# Patient Record
Sex: MALE | Race: WHITE | NOT HISPANIC OR LATINO | Employment: FULL TIME | URBAN - METROPOLITAN AREA
[De-identification: names, ages, dates, MRNs, and addresses within clinical notes are randomized per-mention and may not be internally consistent; named-entity substitution may affect disease eponyms.]

---

## 2018-03-26 ENCOUNTER — HISTORICAL (OUTPATIENT)
Dept: ADMINISTRATIVE | Facility: HOSPITAL | Age: 43
End: 2018-03-26

## 2018-03-26 LAB
ABS NEUT (OLG): 3.37 X10(3)/MCL (ref 2.1–9.2)
ALBUMIN SERPL-MCNC: 4.4 GM/DL (ref 3.4–5)
ALBUMIN/GLOB SERPL: 1 RATIO (ref 1–2)
ALP SERPL-CCNC: 74 UNIT/L (ref 45–117)
ALT SERPL-CCNC: 174 UNIT/L (ref 12–78)
AST SERPL-CCNC: 111 UNIT/L (ref 15–37)
BASOPHILS # BLD AUTO: 0.05 X10(3)/MCL
BASOPHILS NFR BLD AUTO: 1 %
BILIRUB SERPL-MCNC: 0.6 MG/DL (ref 0.2–1)
BILIRUBIN DIRECT+TOT PNL SERPL-MCNC: 0.1 MG/DL
BILIRUBIN DIRECT+TOT PNL SERPL-MCNC: 0.5 MG/DL
BUN SERPL-MCNC: 17 MG/DL (ref 7–18)
CALCIUM SERPL-MCNC: 9 MG/DL (ref 8.5–10.1)
CHLORIDE SERPL-SCNC: 104 MMOL/L (ref 98–107)
CO2 SERPL-SCNC: 26 MMOL/L (ref 21–32)
CREAT SERPL-MCNC: 1.3 MG/DL (ref 0.6–1.3)
CRP SERPL-MCNC: <0.3 MG/DL
EOSINOPHIL # BLD AUTO: 0.11 10*3/UL
EOSINOPHIL NFR BLD AUTO: 2 %
ERYTHROCYTE [DISTWIDTH] IN BLOOD BY AUTOMATED COUNT: 15.3 % (ref 11.5–14.5)
ERYTHROCYTE [SEDIMENTATION RATE] IN BLOOD: 2 MM/HR (ref 0–15)
GLOBULIN SER-MCNC: 3.7 GM/ML (ref 2.3–3.5)
GLUCOSE SERPL-MCNC: 117 MG/DL (ref 74–106)
HAV IGM SERPL QL IA: NONREACTIVE
HBV CORE IGM SERPL QL IA: NONREACTIVE
HBV SURFACE AG SERPL QL IA: NEGATIVE
HCT VFR BLD AUTO: 41.5 % (ref 40–51)
HCV AB SERPL QL IA: NONREACTIVE
HGB BLD-MCNC: 13.1 GM/DL (ref 13.5–17.5)
HIV 1+2 AB+HIV1 P24 AG SERPL QL IA: NONREACTIVE
IMM GRANULOCYTES # BLD AUTO: 0.02 10*3/UL
IMM GRANULOCYTES NFR BLD AUTO: 0 %
LYMPHOCYTES # BLD AUTO: 1.32 X10(3)/MCL
LYMPHOCYTES NFR BLD AUTO: 24 % (ref 13–40)
MCH RBC QN AUTO: 21.3 PG (ref 26–34)
MCHC RBC AUTO-ENTMCNC: 31.6 GM/DL (ref 31–37)
MCV RBC AUTO: 67.5 FL (ref 80–100)
MONOCYTES # BLD AUTO: 0.53 X10(3)/MCL
MONOCYTES NFR BLD AUTO: 10 % (ref 4–12)
NEUTROPHILS # BLD AUTO: 3.37 X10(3)/MCL
NEUTROPHILS NFR BLD AUTO: 62 X10(3)/MCL
PLATELET # BLD AUTO: 339 X10(3)/MCL (ref 130–400)
PMV BLD AUTO: 9.7 FL (ref 7.4–10.4)
POTASSIUM SERPL-SCNC: 4 MMOL/L (ref 3.5–5.1)
PROT SERPL-MCNC: 8.1 GM/DL (ref 6.4–8.2)
RBC # BLD AUTO: 6.15 X10(6)/MCL (ref 4.5–5.9)
SODIUM SERPL-SCNC: 138 MMOL/L (ref 136–145)
WBC # SPEC AUTO: 5.4 X10(3)/MCL (ref 4.5–11)

## 2018-06-13 ENCOUNTER — HISTORICAL (OUTPATIENT)
Dept: RADIOLOGY | Facility: HOSPITAL | Age: 43
End: 2018-06-13

## 2018-09-05 ENCOUNTER — HISTORICAL (OUTPATIENT)
Dept: INTERNAL MEDICINE | Facility: CLINIC | Age: 43
End: 2018-09-05

## 2018-09-05 LAB
ABS NEUT (OLG): 2.31 X10(3)/MCL (ref 2.1–9.2)
ALBUMIN SERPL-MCNC: 4.1 GM/DL (ref 3.4–5)
ALBUMIN/GLOB SERPL: 1 RATIO (ref 1–2)
ALP SERPL-CCNC: 62 UNIT/L (ref 45–117)
ALT SERPL-CCNC: 41 UNIT/L (ref 12–78)
AST SERPL-CCNC: 38 UNIT/L (ref 15–37)
BASOPHILS # BLD AUTO: 0.05 X10(3)/MCL
BASOPHILS NFR BLD AUTO: 1 %
BILIRUB SERPL-MCNC: 0.5 MG/DL (ref 0.2–1)
BILIRUBIN DIRECT+TOT PNL SERPL-MCNC: 0.1 MG/DL
BILIRUBIN DIRECT+TOT PNL SERPL-MCNC: 0.4 MG/DL
BUN SERPL-MCNC: 20 MG/DL (ref 7–18)
CALCIUM SERPL-MCNC: 8.9 MG/DL (ref 8.5–10.1)
CHLORIDE SERPL-SCNC: 102 MMOL/L (ref 98–107)
CO2 SERPL-SCNC: 27 MMOL/L (ref 21–32)
CREAT SERPL-MCNC: 1.2 MG/DL (ref 0.6–1.3)
EOSINOPHIL # BLD AUTO: 0.11 X10(3)/MCL
EOSINOPHIL NFR BLD AUTO: 2 %
ERYTHROCYTE [DISTWIDTH] IN BLOOD BY AUTOMATED COUNT: 14.6 % (ref 11.5–14.5)
GLOBULIN SER-MCNC: 3.5 GM/ML (ref 2.3–3.5)
GLUCOSE SERPL-MCNC: 173 MG/DL (ref 74–106)
HCT VFR BLD AUTO: 40.1 % (ref 40–51)
HGB BLD-MCNC: 12.4 GM/DL (ref 13.5–17.5)
IMM GRANULOCYTES # BLD AUTO: 0.01 10*3/UL
IMM GRANULOCYTES NFR BLD AUTO: 0 %
LYMPHOCYTES # BLD AUTO: 1.61 X10(3)/MCL
LYMPHOCYTES NFR BLD AUTO: 35 % (ref 13–40)
MCH RBC QN AUTO: 20.9 PG (ref 26–34)
MCHC RBC AUTO-ENTMCNC: 30.9 GM/DL (ref 31–37)
MCV RBC AUTO: 67.7 FL (ref 80–100)
MONOCYTES # BLD AUTO: 0.52 X10(3)/MCL
MONOCYTES NFR BLD AUTO: 11 % (ref 4–12)
NEUTROPHILS # BLD AUTO: 2.31 X10(3)/MCL
NEUTROPHILS NFR BLD AUTO: 50 X10(3)/MCL
PLATELET # BLD AUTO: 338 X10(3)/MCL (ref 130–400)
PMV BLD AUTO: 10 FL (ref 7.4–10.4)
POTASSIUM SERPL-SCNC: 4 MMOL/L (ref 3.5–5.1)
PROT SERPL-MCNC: 7.6 GM/DL (ref 6.4–8.2)
RBC # BLD AUTO: 5.92 X10(6)/MCL (ref 4.5–5.9)
SODIUM SERPL-SCNC: 135 MMOL/L (ref 136–145)
WBC # SPEC AUTO: 4.6 X10(3)/MCL (ref 4.5–11)

## 2019-07-02 ENCOUNTER — HISTORICAL (OUTPATIENT)
Dept: RADIOLOGY | Facility: HOSPITAL | Age: 44
End: 2019-07-02

## 2019-07-20 ENCOUNTER — HISTORICAL (OUTPATIENT)
Dept: LAB | Facility: HOSPITAL | Age: 44
End: 2019-07-20

## 2019-07-20 LAB
ALBUMIN SERPL-MCNC: 4.1 GM/DL (ref 3.4–5)
ALBUMIN/GLOB SERPL: 1.2 RATIO (ref 1.1–2)
ALP SERPL-CCNC: 66 UNIT/L (ref 46–116)
ALT SERPL-CCNC: 89 UNIT/L (ref 12–78)
AST SERPL-CCNC: 61 UNIT/L (ref 15–37)
BILIRUB SERPL-MCNC: 0.5 MG/DL (ref 0.2–1)
BILIRUBIN DIRECT+TOT PNL SERPL-MCNC: 0.14 MG/DL (ref 0–0.2)
BILIRUBIN DIRECT+TOT PNL SERPL-MCNC: 0.36 MG/DL (ref 0–0.8)
BUN SERPL-MCNC: 27.1 MG/DL (ref 7–18)
CALCIUM SERPL-MCNC: 8.9 MG/DL (ref 8.5–10.1)
CHLORIDE SERPL-SCNC: 103 MMOL/L (ref 98–107)
CHOLEST SERPL-MCNC: 256 MG/DL (ref 0–200)
CHOLEST/HDLC SERPL: 5.8 {RATIO} (ref 0–5)
CO2 SERPL-SCNC: 28.3 MMOL/L (ref 21–32)
CREAT SERPL-MCNC: 1.33 MG/DL (ref 0.6–1.3)
FT4I SERPL CALC-MCNC: 2.14
GLOBULIN SER-MCNC: 3.4 GM/DL (ref 2.4–3.5)
GLUCOSE SERPL-MCNC: 112 MG/DL (ref 74–106)
HDLC SERPL-MCNC: 44 MG/DL (ref 40–60)
LDLC SERPL CALC-MCNC: 148 MG/DL (ref 0–129)
POTASSIUM SERPL-SCNC: 3.9 MMOL/L (ref 3.5–5.1)
PROT SERPL-MCNC: 7.5 GM/DL (ref 6.4–8.2)
SODIUM SERPL-SCNC: 140 MMOL/L (ref 136–145)
T3RU NFR SERPL: 35 % (ref 31–39)
T4 SERPL-MCNC: 6.1 MCG/DL (ref 4.7–13.3)
TRIGL SERPL-MCNC: 322 MG/DL
TSH SERPL-ACNC: 3.34 MIU/ML (ref 0.36–3.74)
VLDLC SERPL CALC-MCNC: 64 MG/DL

## 2019-07-26 ENCOUNTER — HISTORICAL (OUTPATIENT)
Dept: RADIOLOGY | Facility: HOSPITAL | Age: 44
End: 2019-07-26

## 2019-07-26 LAB
HAV IGM SERPL QL IA: NEGATIVE
HBV CORE IGM SERPL QL IA: NEGATIVE
HBV SURFACE AG SERPL QL IA: NEGATIVE
HCV AB SERPL QL IA: NEGATIVE
HEPATITIS PANEL INTERP: NORMAL

## 2022-05-03 NOTE — HISTORICAL [facility]ER
This is a historical note converted from Chastity. Formatting and pictures may have been removed.  Please reference Chastity for original formatting and attached multimedia. Chief Complaint  dactylis took his blood pressure pills recently  History of Present Illness  This 43-year-old  male with past medical history of dactylitis originally diagnosed with Dr. Syed 5+ years ago in her privative practice who presents to [facility] rheumatology clinic for?establishment here.??He was diagnosed with tonsillitis because he did not?seem to?clinically or serologically the criteria for other diagnoses.??He states he was at one point on Humira?injections?however stopped 3 years ago.?Hhe does continue to get frequent flareups affecting his MCPs wrists and bilateral knees. ?Last?flareup of?the S September 2017 affecting his right with signs of edema,?synovitis?and swelling. ?She currently has swelling and left MCPs and states that?his frequent discomfort as he is a  and uses his hands?for work. ?At this initial visit with?like to?have lab work to test for?seronegative spondyloarthropathies as well as x-ray of chest?pelvis and hands. ?Patient is given Medrol Dosepak to have on hold if flareup occurs. ?Patient follow-up in 4 months. ?At this time will not start patient on any medication will just observe and?watch for disease progression.  ?  Review of Systems  Constitutional: no weightloss,?fever, chills, weakness or fatigue  Eye: No visual loss, blurred vision, double vision or yellow sclerae.  ENMT: No hearing loss, sneezing, congestion, runny nose or sore throat.  Respiratory: No shortness of breath, cough or sputum. No Dyspnea on exertion.  Cardiovascular: No chest pain, chest pressure or chest discomfort. No palpitations or edema.  Gastrointestinal: no anorexia, nausea, vomiting or diarrhea. No abdominal pain or blood.  Genitourinary: No burning on urination.  Musculoskeletal:+hand, knee, ankle, and wrist  pain  Integumentary: No rash or itching.  Neurologic: No headache, dizziness, syncope, paralysis, ataxia, numbness or tingling in the extremities. No change in bowel or bladder control.  All Other ROS_  Physical Exam  Vitals & Measurements  T:?36.6? ?C (Oral)? HR:?73(Peripheral)? RR:?18? BP:?149/97?  HT:?185?cm? HT:?185?cm? WT:?88.6?kg? WT:?88.6?kg? BMI:?25.89?  General: AAOx3, NAD, alert and cooperative  HEENT: PERRLA, EOMI, normal conjunctiva  Neck: no LAD, no JVD, supple, no masses or thyromegaly  CVS: S1/S2 nml, RRR, no murmurs, rubs or gallops, no carotid bruits  Resp: CTA b/l, no wheezing, no rhonchi, no rales  GI: Not distended, not tender, BS+, no guarding  Skin: not jaundiced, warm, no rashes  Musculoskeletal: normal ROM, no joint tenderness, normal muscular development  Extremities: + Slight erythema and signs of?synovitis of?left 1st MCP.  Neuro: CN II-XII grossly intact, strength and sensation symmetric and intact throughout, no focal neurological deficits  Assessment/Plan  1. ?Dactylitis  ??? - Patient with no obvious physical signs of other diagnosis at this point  ????- lab work CBC, CMP, ESR, SED, HLAb-27, HIV, hepatitis, and TB?quant gold  ??? -?Xray- hands, pelvis, chest  ??? - medrol-dose pack on hold if he has a flare before follow up visit.  ?  ?  ?  Follow-up in 4 months   Problem List/Past Medical History  Ongoing  No chronic problems  Historical  No qualifying data  Medications  ALLERGY TAB 10MG, 10 mg= 1 tab(s), Oral, Daily  AMLODIPINE TAB 10MG, 10 mg= 1 tab(s), Oral, Daily  atenolol 25 mg oral tablet, 25 mg= 1 tab(s), Oral, Daily  BUSPIRONE TAB 15MG  desvenlafaxine 50 mg oral tablet, extended release, 50 mg= 1 tab(s), Oral, Daily  DICLOFENAC TAB 75MG DR, 75 mg= 1 tab(s), Oral, BID,? ?Not taking: Last Dose Date/Time unknown  GABAPENTIN CAP 100MG, 300 mg= 3 cap(s), Oral, TID,? ?Not taking: Last Dose Date/Time Unknown  GABAPENTIN TAB 800MG, 800 mg= 1 tab(s), Oral, TID  HYDROCHLOROT CAP 12.5MG,  12.5 mg= 1 cap(s), Oral, Daily  HYDROXYZ HCL TAB 50MG  INDOMETHACIN CAP 25MG, 25 mg= 1 cap(s), Oral, Daily,? ?Not taking: Last Dose Date/Time Unknown  INDOMETHACIN CAP 50MG  LOSARTAN POT TAB 50MG, 50 mg= 1 tab(s), Oral, Daily  METHOCARBAM TAB 750MG, 750 mg= 1 tab(s), Oral, TID  PREDNISONE TAB 20MG,? ?Not taking: Last Dose Date/Time unknown  TRAZODONE TAB 150MG, 150 mg= 1 tab(s), Oral, qPM  VENLAFAXINE TAB 75MG  Allergies  No Known Allergies  Social History  Alcohol - Low Risk, 09/23/2014  Current, 3-5 times per week, 09/23/2014  Employment/School  Employed, Activity level: Moderate physical work. Highest education level: High school. Operates hazardous equipment: No., 03/26/2018  Exercise  Self assessment: Fair condition., 03/26/2018  Home/Environment  Lives with Children, Spouse. Living situation: Home/Independent. Alcohol abuse in household: No. Substance abuse in household: No. Smoker in household: No. Injuries/Abuse/Neglect in household: No. Feels unsafe at home: No. Family/Friends available for support: Yes. Concern for family members at home: No. Major illness in household: No. Financial concerns: No. TV/Computer concerns: No., 03/26/2018  Nutrition/Health  Regular, 03/26/2018  Substance Abuse - Denies Substance Abuse, 09/23/2014  Never, 03/26/2018  Tobacco - Denies Tobacco Use, 09/23/2014  Never smoker, 09/26/2017  Family History  Cancer: Father.  Heart failure.: Father.  Hypertension.: Mother and Father.  Rheumatoid arthritis: Father.      HLA-B27 + result

## 2022-06-01 ENCOUNTER — HOSPITAL ENCOUNTER (EMERGENCY)
Facility: HOSPITAL | Age: 47
Discharge: HOME | End: 2022-06-01
Attending: STUDENT IN AN ORGANIZED HEALTH CARE EDUCATION/TRAINING PROGRAM

## 2022-06-01 VITALS
BODY MASS INDEX: 24.38 KG/M2 | HEART RATE: 57 BPM | OXYGEN SATURATION: 99 % | RESPIRATION RATE: 18 BRPM | DIASTOLIC BLOOD PRESSURE: 82 MMHG | WEIGHT: 190 LBS | HEIGHT: 74 IN | TEMPERATURE: 98 F | SYSTOLIC BLOOD PRESSURE: 150 MMHG

## 2022-06-01 DIAGNOSIS — M51.27 HERNIATION OF INTERVERTEBRAL DISC BETWEEN L5 AND S1: Primary | ICD-10-CM

## 2022-06-01 DIAGNOSIS — G95.20 SPINAL CORD COMPRESSION: ICD-10-CM

## 2022-06-01 PROCEDURE — 63600175 PHARM REV CODE 636 W HCPCS: Performed by: STUDENT IN AN ORGANIZED HEALTH CARE EDUCATION/TRAINING PROGRAM

## 2022-06-01 PROCEDURE — 99285 EMERGENCY DEPT VISIT HI MDM: CPT | Mod: 25

## 2022-06-01 PROCEDURE — 96374 THER/PROPH/DIAG INJ IV PUSH: CPT

## 2022-06-01 PROCEDURE — 96375 TX/PRO/DX INJ NEW DRUG ADDON: CPT

## 2022-06-01 RX ORDER — MORPHINE SULFATE 4 MG/ML
4 INJECTION, SOLUTION INTRAMUSCULAR; INTRAVENOUS
Status: COMPLETED | OUTPATIENT
Start: 2022-06-01 | End: 2022-06-01

## 2022-06-01 RX ORDER — DIPHENHYDRAMINE HYDROCHLORIDE 50 MG/ML
50 INJECTION INTRAMUSCULAR; INTRAVENOUS
Status: COMPLETED | OUTPATIENT
Start: 2022-06-01 | End: 2022-06-01

## 2022-06-01 RX ORDER — TRAMADOL HYDROCHLORIDE 50 MG/1
50 TABLET ORAL EVERY 6 HOURS PRN
Qty: 15 TABLET | Refills: 0 | Status: SHIPPED | OUTPATIENT
Start: 2022-06-01 | End: 2022-11-16 | Stop reason: SDUPTHER

## 2022-06-01 RX ORDER — KETOROLAC TROMETHAMINE 10 MG/1
10 TABLET, FILM COATED ORAL EVERY 6 HOURS
Qty: 20 TABLET | Refills: 0 | Status: SHIPPED | OUTPATIENT
Start: 2022-06-01 | End: 2022-06-06

## 2022-06-01 RX ORDER — PREDNISONE 50 MG/1
50 TABLET ORAL DAILY
Qty: 7 TABLET | Refills: 0 | Status: SHIPPED | OUTPATIENT
Start: 2022-06-01 | End: 2022-11-16 | Stop reason: SDUPTHER

## 2022-06-01 RX ORDER — GABAPENTIN 300 MG/1
300 CAPSULE ORAL 3 TIMES DAILY
Qty: 90 CAPSULE | Refills: 0 | Status: SHIPPED | OUTPATIENT
Start: 2022-06-01 | End: 2022-06-01 | Stop reason: CLARIF

## 2022-06-01 RX ADMIN — DIPHENHYDRAMINE HYDROCHLORIDE 50 MG: 50 INJECTION, SOLUTION INTRAMUSCULAR; INTRAVENOUS at 05:06

## 2022-06-01 RX ADMIN — MORPHINE SULFATE 4 MG: 4 INJECTION INTRAVENOUS at 05:06

## 2022-06-01 NOTE — Clinical Note
"Helder Phillipssarah Fish was seen and treated in our emergency department on 6/1/2022.  He may return to work on 06/06/2022.  Upon return joanie work please allow light duty until further evaluation by MD     If you have any questions or concerns, please don't hesitate to call.      Earline Lainez MD"

## 2022-06-01 NOTE — ED NOTES
Pt fell off a ladder - landed on his abdoman- free of any contusions or pain to abd.  Reports lower back pain- gait steady- extremities free of any pain or edema-denies numbness or tingling to arms or legs. Denies visual problems- rt eye ecchymosis noted- perrla. Denies loc. Denies neck pain- free of nv.  C/o rt thumb/hand pain - mild swelling noted- cap refill <2secs/+rom of thumb.
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
## 2022-06-01 NOTE — ED NOTES
DR ROCHE SPOKE W NEUROSURGERY ON THE PHONE- PT TCAN BE TREATED CONSERVATIVELY DUE TO  HAVING NO DEFICITS- WLL NOT BE TRANSFERRED.  MORGAN MELCHOR IN TO UPDATE PT .

## 2022-06-01 NOTE — DISCHARGE INSTRUCTIONS
Take all medications as prescribed- take home Rx gabapentin    RETURN TO ED FOR ANY WEAKNESS/NUMBNESS/LOSS OF BOWEL OR BLADDER FUNCTION

## 2022-06-01 NOTE — ED NOTES
PT ITCHING RESOLVED- REDNESS RESOLVED- FREE OF ANY HIVES. AWAITS [facility] TO CALL BACK ABOUT ACCEPTANCE.

## 2022-06-01 NOTE — Clinical Note
"Helder Phillipssarah Fish was seen and treated in our emergency department on 6/1/2022.  He may return to work on 06/06/2022.  Upon return to work please allow light duty until further evaluation by MD     If you have any questions or concerns, please don't hesitate to call.      Earline Lainez MD"
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
## 2022-11-16 ENCOUNTER — HOSPITAL ENCOUNTER (EMERGENCY)
Facility: HOSPITAL | Age: 47
Discharge: HOME | End: 2022-11-16
Attending: FAMILY MEDICINE
Payer: MEDICAID

## 2022-11-16 VITALS
SYSTOLIC BLOOD PRESSURE: 165 MMHG | TEMPERATURE: 98 F | DIASTOLIC BLOOD PRESSURE: 98 MMHG | HEIGHT: 75 IN | BODY MASS INDEX: 23.62 KG/M2 | RESPIRATION RATE: 18 BRPM | HEART RATE: 61 BPM | WEIGHT: 190 LBS | OXYGEN SATURATION: 98 %

## 2022-11-16 DIAGNOSIS — R52 PAIN: ICD-10-CM

## 2022-11-16 DIAGNOSIS — M25.569 KNEE PAIN, UNSPECIFIED CHRONICITY, UNSPECIFIED LATERALITY: Primary | ICD-10-CM

## 2022-11-16 PROCEDURE — 63600175 PHARM REV CODE 636 W HCPCS: Performed by: FAMILY MEDICINE

## 2022-11-16 PROCEDURE — 99284 EMERGENCY DEPT VISIT MOD MDM: CPT | Mod: 25

## 2022-11-16 PROCEDURE — 96372 THER/PROPH/DIAG INJ SC/IM: CPT | Performed by: FAMILY MEDICINE

## 2022-11-16 RX ORDER — TRAMADOL HYDROCHLORIDE 50 MG/1
50 TABLET ORAL EVERY 6 HOURS PRN
Qty: 12 TABLET | Refills: 0 | Status: SHIPPED | OUTPATIENT
Start: 2022-11-16 | End: 2022-11-19

## 2022-11-16 RX ORDER — KETOROLAC TROMETHAMINE 30 MG/ML
60 INJECTION, SOLUTION INTRAMUSCULAR; INTRAVENOUS
Status: COMPLETED | OUTPATIENT
Start: 2022-11-16 | End: 2022-11-16

## 2022-11-16 RX ORDER — PREDNISONE 50 MG/1
50 TABLET ORAL DAILY
Qty: 5 TABLET | Refills: 0 | Status: SHIPPED | OUTPATIENT
Start: 2022-11-16 | End: 2022-11-21

## 2022-11-16 RX ADMIN — KETOROLAC TROMETHAMINE 60 MG: 30 INJECTION, SOLUTION INTRAMUSCULAR at 05:11

## 2022-11-16 NOTE — ED PROVIDER NOTES
Encounter Date: 11/16/2022       History     Chief Complaint   Patient presents with    Knee Pain     Left Knee pain, may have twisted it x 1 week.      47-year-old with left knee pain patient may have twisted the knee 1 week ago otherwise no abrasions or lacerations no other signs of injury no head injury GCS of 15 ambulation makes it worse immobilization makes it better patient can not bear weight knee does pop occasionally      Review of patient's allergies indicates:  No Known Allergies  No past medical history on file.  No past surgical history on file.  No family history on file.     Review of Systems   Constitutional:  Negative for fever.   HENT:  Negative for sore throat.    Respiratory:  Negative for shortness of breath.    Cardiovascular:  Negative for chest pain.   Gastrointestinal:  Negative for nausea.   Genitourinary:  Negative for dysuria.   Musculoskeletal:  Positive for arthralgias and myalgias. Negative for back pain.   Skin:  Negative for rash.   Neurological:  Negative for weakness.   Hematological:  Does not bruise/bleed easily.   All other systems reviewed and are negative.    Physical Exam     Initial Vitals [11/16/22 1703]   BP Pulse Resp Temp SpO2   (!) 165/98 61 18 98.3 °F (36.8 °C) 98 %      MAP       --         Physical Exam    Nursing note and vitals reviewed.  Constitutional: He appears well-developed and well-nourished. He is not diaphoretic. No distress.   HENT:   Head: Normocephalic and atraumatic.   Right Ear: External ear normal.   Left Ear: External ear normal.   Nose: Nose normal.   Mouth/Throat: Oropharynx is clear and moist. No oropharyngeal exudate.   Eyes: Conjunctivae and EOM are normal. Pupils are equal, round, and reactive to light. Right eye exhibits no discharge. Left eye exhibits no discharge.   Neck: Neck supple. No thyromegaly present. No tracheal deviation present. No JVD present.   Normal range of motion.  Cardiovascular:  Normal rate, regular rhythm, normal heart  sounds and intact distal pulses.     Exam reveals no gallop and no friction rub.       No murmur heard.  Pulmonary/Chest: Breath sounds normal. No stridor. No respiratory distress. He has no wheezes. He has no rhonchi. He has no rales. He exhibits no tenderness.   Abdominal: Abdomen is soft. Bowel sounds are normal. He exhibits no distension. There is no abdominal tenderness. There is no rebound and no guarding.   Musculoskeletal:         General: Tenderness present. No edema. Normal range of motion.      Cervical back: Normal range of motion and neck supple.     Lymphadenopathy:     He has no cervical adenopathy.   Neurological: He is alert and oriented to person, place, and time. He has normal strength and normal reflexes. No cranial nerve deficit or sensory deficit. GCS score is 15. GCS eye subscore is 4. GCS verbal subscore is 5. GCS motor subscore is 6.   Skin: Skin is warm and dry. No rash and no abscess noted. No erythema.   Psychiatric: He has a normal mood and affect. His behavior is normal. Judgment and thought content normal.       ED Course   Procedures  Labs Reviewed - No data to display       Imaging Results    None          Medications - No data to display                           Clinical Impression:   Final diagnoses:  [R52] Pain               Johnathon Urbina MD  11/18/22 1955

## 2023-01-16 ENCOUNTER — HOSPITAL ENCOUNTER (EMERGENCY)
Facility: HOSPITAL | Age: 48
Discharge: HOME | End: 2023-01-16
Attending: STUDENT IN AN ORGANIZED HEALTH CARE EDUCATION/TRAINING PROGRAM
Payer: MEDICAID

## 2023-01-16 VITALS
TEMPERATURE: 98 F | HEIGHT: 73 IN | BODY MASS INDEX: 25.18 KG/M2 | HEART RATE: 65 BPM | WEIGHT: 190 LBS | RESPIRATION RATE: 20 BRPM | SYSTOLIC BLOOD PRESSURE: 156 MMHG | DIASTOLIC BLOOD PRESSURE: 92 MMHG | OXYGEN SATURATION: 99 %

## 2023-01-16 DIAGNOSIS — M54.31 SCIATICA OF RIGHT SIDE: Primary | ICD-10-CM

## 2023-01-16 PROCEDURE — 96372 THER/PROPH/DIAG INJ SC/IM: CPT | Performed by: STUDENT IN AN ORGANIZED HEALTH CARE EDUCATION/TRAINING PROGRAM

## 2023-01-16 PROCEDURE — 99284 EMERGENCY DEPT VISIT MOD MDM: CPT

## 2023-01-16 PROCEDURE — 63600175 PHARM REV CODE 636 W HCPCS: Performed by: STUDENT IN AN ORGANIZED HEALTH CARE EDUCATION/TRAINING PROGRAM

## 2023-01-16 PROCEDURE — 25000003 PHARM REV CODE 250: Performed by: STUDENT IN AN ORGANIZED HEALTH CARE EDUCATION/TRAINING PROGRAM

## 2023-01-16 RX ORDER — METHOCARBAMOL 750 MG/1
1500 TABLET, FILM COATED ORAL 2 TIMES DAILY PRN
Qty: 20 TABLET | Refills: 0 | Status: SHIPPED | OUTPATIENT
Start: 2023-01-16 | End: 2023-01-21

## 2023-01-16 RX ORDER — HYDROCODONE BITARTRATE AND ACETAMINOPHEN 10; 325 MG/1; MG/1
1 TABLET ORAL
Status: COMPLETED | OUTPATIENT
Start: 2023-01-16 | End: 2023-01-16

## 2023-01-16 RX ORDER — HYDROCODONE BITARTRATE AND ACETAMINOPHEN 5; 325 MG/1; MG/1
1 TABLET ORAL EVERY 6 HOURS PRN
Qty: 12 TABLET | Refills: 0 | OUTPATIENT
Start: 2023-01-16 | End: 2023-01-20

## 2023-01-16 RX ORDER — KETOROLAC TROMETHAMINE 30 MG/ML
30 INJECTION, SOLUTION INTRAMUSCULAR; INTRAVENOUS
Status: COMPLETED | OUTPATIENT
Start: 2023-01-16 | End: 2023-01-16

## 2023-01-16 RX ADMIN — HYDROCODONE BITARTRATE AND ACETAMINOPHEN 1 TABLET: 10; 325 TABLET ORAL at 11:01

## 2023-01-16 RX ADMIN — KETOROLAC TROMETHAMINE 30 MG: 30 INJECTION, SOLUTION INTRAMUSCULAR; INTRAVENOUS at 11:01

## 2023-01-16 NOTE — ED PROVIDER NOTES
"Encounter Date: 1/16/2023       History     Chief Complaint   Patient presents with    Back Pain     Complains of right lower back pain/sciatic pain since yesterday after turning     Patient is a 47-year-old white male no significant past medical history presented to the ER today due to right-sided back pain.  Patient states he turned wrong when lifting something in his work.  Patient states he is felt sharp pain radiating down the posterior aspect of his right leg ever since.  Injury occurred approximately 2 days ago.  Patient denies any urinary incontinence, urinary retention, fecal incontinence, saddle anesthesia.  Patient has taken some gabapentin for it without much relief.  Patient states he is had issues and this is probably the "4th flare up. "Patient states he usually takes pain medicine and he gets an injection which resolves it.  Patient has a ride home today.  Patient denies any interval trauma, chest pain, shortness of breath, abdominal pain.    Review of patient's allergies indicates:  No Known Allergies  Past Medical History:   Diagnosis Date    Arthritis     Back pain     chronic    Hypertension      History reviewed. No pertinent surgical history.  History reviewed. No pertinent family history.  Social History     Tobacco Use    Smoking status: Never    Smokeless tobacco: Never   Substance Use Topics    Alcohol use: Yes     Comment: occas     Review of Systems   Constitutional:  Negative for chills, fatigue and fever.   HENT:  Negative for congestion, sore throat and trouble swallowing.    Eyes:  Negative for pain and visual disturbance.   Respiratory:  Negative for cough, shortness of breath and wheezing.    Cardiovascular:  Negative for chest pain and palpitations.   Gastrointestinal:  Negative for abdominal pain, blood in stool, constipation, diarrhea, nausea and vomiting.   Genitourinary:  Negative for dysuria and hematuria.   Musculoskeletal:  Positive for back pain. Negative for myalgias. "   Skin:  Negative for rash and wound.   Neurological:  Negative for seizures, syncope and headaches.   Psychiatric/Behavioral:  Negative for confusion. The patient is not nervous/anxious.      Physical Exam     Initial Vitals [01/16/23 0949]   BP Pulse Resp Temp SpO2   (!) 156/92 65 18 97.8 °F (36.6 °C) 99 %      MAP       --         Physical Exam    Nursing note and vitals reviewed.  Constitutional: He appears well-developed and well-nourished. No distress.   HENT:   Head: Normocephalic and atraumatic.   Eyes: Conjunctivae and EOM are normal. Right eye exhibits no discharge. Left eye exhibits no discharge. No scleral icterus.   Neck: No tracheal deviation present.   Normal range of motion.  Cardiovascular:  Normal rate, regular rhythm and normal heart sounds.     Exam reveals no gallop and no friction rub.       No murmur heard.  Pulmonary/Chest: Breath sounds normal. No respiratory distress. He has no wheezes. He has no rhonchi. He has no rales.   Musculoskeletal:         General: Tenderness present. No edema. Normal range of motion.      Cervical back: Normal range of motion.      Comments: No C, T, L-spine tenderness no step-off lesions.  There is some tenderness palpation of the right gluteus region.  No rashes.  Positive straight leg raise on the right.     Neurological: He is alert.   Skin: Skin is warm and dry. No rash and no abscess noted. No erythema. No pallor.   Psychiatric: His behavior is normal. Judgment normal.       ED Course   Procedures  Labs Reviewed - No data to display       Imaging Results    None          Medications   HYDROcodone-acetaminophen  mg per tablet 1 tablet (has no administration in time range)   ketorolac injection 30 mg (has no administration in time range)     Medical Decision Making:   Initial Assessment:   Overall well-appearing 47-year-old male  Differential Diagnosis:   Sciatica, lumbar strain, vertebral fracture  ED Management:  Vital signs stable patient is  afebrile  Denies red flag symptoms back pain   There is no L-spine, T-spine or C-spine tenderness and no step-off lesions   Tenderness in the gluteal region with a positive straight leg raise most consistent with sciatica   No indication for imaging at this time as it does not seem to be a traumatic cause   Seems to be lifting injury   Cuney 10/325 given as patient does have a ride home   Rice therapy advised   Robaxin sent to pharmacy   Return precautions discussed and follow up with PCP is recommended                        Clinical Impression:   Final diagnoses:  [M54.31] Sciatica of right side (Primary)        ED Disposition Condition    Discharge Stable          ED Prescriptions       Medication Sig Dispense Start Date End Date Auth. Provider    HYDROcodone-acetaminophen (NORCO) 5-325 mg per tablet Take 1 tablet by mouth every 6 (six) hours as needed for Pain. 12 tablet 1/16/2023 -- Farhan Humphrey MD    methocarbamoL (ROBAXIN) 750 MG Tab Take 2 tablets (1,500 mg total) by mouth 2 (two) times daily as needed (pain). 20 tablet 1/16/2023 1/21/2023 Farhan Humphrey MD          Follow-up Information       Follow up With Specialties Details Why Contact Info    [facility] - Emergency Dept Emergency Medicine  If symptoms worsen [address] [address] [phone]             Farhan Humphrey MD  01/16/23 1321

## 2023-01-16 NOTE — Clinical Note
"Helder Fish (Brian) was seen and treated in our emergency department on 1/16/2023.  He may return to work on 01/17/2023.       If you have any questions or concerns, please don't hesitate to call.       RN    "

## 2023-01-20 ENCOUNTER — HOSPITAL ENCOUNTER (EMERGENCY)
Facility: HOSPITAL | Age: 48
Discharge: HOME | End: 2023-01-20
Attending: EMERGENCY MEDICINE
Payer: MEDICAID

## 2023-01-20 VITALS
RESPIRATION RATE: 18 BRPM | WEIGHT: 190 LBS | HEART RATE: 66 BPM | HEIGHT: 74 IN | BODY MASS INDEX: 24.38 KG/M2 | DIASTOLIC BLOOD PRESSURE: 82 MMHG | SYSTOLIC BLOOD PRESSURE: 132 MMHG | OXYGEN SATURATION: 97 % | TEMPERATURE: 99 F

## 2023-01-20 DIAGNOSIS — M54.31 SCIATICA, RIGHT SIDE: Primary | ICD-10-CM

## 2023-01-20 DIAGNOSIS — M51.26 LUMBAR DISC HERNIATION: ICD-10-CM

## 2023-01-20 PROCEDURE — 25000003 PHARM REV CODE 250: Performed by: EMERGENCY MEDICINE

## 2023-01-20 PROCEDURE — 99285 EMERGENCY DEPT VISIT HI MDM: CPT | Mod: 25

## 2023-01-20 PROCEDURE — 96372 THER/PROPH/DIAG INJ SC/IM: CPT | Performed by: EMERGENCY MEDICINE

## 2023-01-20 PROCEDURE — 63600175 PHARM REV CODE 636 W HCPCS: Performed by: EMERGENCY MEDICINE

## 2023-01-20 RX ORDER — OXYCODONE AND ACETAMINOPHEN 10; 325 MG/1; MG/1
1 TABLET ORAL EVERY 6 HOURS PRN
Qty: 27 TABLET | Refills: 0 | Status: SHIPPED | OUTPATIENT
Start: 2023-01-20 | End: 2023-01-26 | Stop reason: ALTCHOICE

## 2023-01-20 RX ORDER — DEXAMETHASONE SODIUM PHOSPHATE 4 MG/ML
8 INJECTION, SOLUTION INTRA-ARTICULAR; INTRALESIONAL; INTRAMUSCULAR; INTRAVENOUS; SOFT TISSUE
Status: COMPLETED | OUTPATIENT
Start: 2023-01-20 | End: 2023-01-20

## 2023-01-20 RX ORDER — PREDNISONE 20 MG/1
20 TABLET ORAL DAILY
Qty: 19 TABLET | Refills: 0 | Status: SHIPPED | OUTPATIENT
Start: 2023-01-20 | End: 2023-01-27

## 2023-01-20 RX ORDER — HYDROMORPHONE HYDROCHLORIDE 2 MG/ML
1 INJECTION, SOLUTION INTRAMUSCULAR; INTRAVENOUS; SUBCUTANEOUS
Status: COMPLETED | OUTPATIENT
Start: 2023-01-20 | End: 2023-01-20

## 2023-01-20 RX ORDER — ONDANSETRON 4 MG/1
4 TABLET, ORALLY DISINTEGRATING ORAL
Status: COMPLETED | OUTPATIENT
Start: 2023-01-20 | End: 2023-01-20

## 2023-01-20 RX ADMIN — ONDANSETRON 4 MG: 4 TABLET, ORALLY DISINTEGRATING ORAL at 01:01

## 2023-01-20 RX ADMIN — DEXAMETHASONE SODIUM PHOSPHATE 8 MG: 4 INJECTION, SOLUTION INTRA-ARTICULAR; INTRALESIONAL; INTRAMUSCULAR; INTRAVENOUS; SOFT TISSUE at 01:01

## 2023-01-20 RX ADMIN — HYDROMORPHONE HYDROCHLORIDE 1 MG: 2 INJECTION INTRAMUSCULAR; INTRAVENOUS; SUBCUTANEOUS at 01:01

## 2023-01-20 NOTE — Clinical Note
"Helder Phillipssarah Fish was seen and treated in our emergency department on 1/20/2023.  He may return to work on 01/31/2023.  Or when cleared by MD     If you have any questions or concerns, please don't hesitate to call.      William Dougherty Jr., MD"

## 2023-01-20 NOTE — ED PROVIDER NOTES
Encounter Date: 1/20/2023       History     Chief Complaint   Patient presents with    Back Pain     C/o acute on chronic sciatica.Seen recently at [facility] ED for same 2 days ago     47-year-old male is here with his wife stating he is having an exacerbation of his chronic right low back pain with associated sciatica.  He states he is aware that he has a disc herniation and was told that he needs to see a spine surgeon and that it could become paralyzed if he did not get this taken care of.  He has never contacted a spine surgeon and comes to the ER today seeking pain relief.  He denies recent injury.  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  He has some paresthesias in his right foot.  His pain radiates from his right low back posteriorly into his right buttock and right lower extremity down to his calf.  He states he went to a chiropractor yesterday and had a adjustment but it did not help.  He has no saddle anesthesia nor weakness.  He has no incontinence no retention of urine or feces.    Review of patient's allergies indicates:  No Known Allergies  Past Medical History:   Diagnosis Date    Arthritis     Back pain     chronic    Hypertension      No past surgical history on file.  No family history on file.  Social History     Tobacco Use    Smoking status: Never    Smokeless tobacco: Never   Substance Use Topics    Alcohol use: Yes     Comment: occas     Review of Systems   Constitutional:  Negative for fever.   HENT:  Negative for sore throat.    Respiratory:  Negative for shortness of breath.    Cardiovascular:  Negative for chest pain.   Gastrointestinal:  Negative for nausea.   Genitourinary:  Negative for dysuria.   Musculoskeletal:  Positive for back pain.   Skin:  Negative for rash.   Neurological:  Negative for weakness.   Hematological:  Does not bruise/bleed easily.     Physical Exam     Initial Vitals [01/20/23 1113]   BP  Pulse Resp Temp SpO2   132/82 66 18 98.6 °F (37 °C) 97 %      MAP       --         Physical Exam    Nursing note and vitals reviewed.  Constitutional: He appears well-developed.   HENT:   Head: Normocephalic.   Eyes: Conjunctivae are normal.   Cardiovascular:  Normal rate, regular rhythm and normal heart sounds.           Pulmonary/Chest: Breath sounds normal.   Abdominal: Abdomen is soft. Bowel sounds are normal. He exhibits no distension. There is no abdominal tenderness.   Musculoskeletal:         General: No edema.     Lymphadenopathy:     He has no cervical adenopathy.   Neurological: He is alert and oriented to person, place, and time. He has normal strength. A sensory deficit is present.   Patient has decreased sensation to light touch in the right lower extremity versus the left lower extremity although it is not   Skin: Skin is warm.   Psychiatric: He has a normal mood and affect.       ED Course   Procedures  Labs Reviewed - No data to display       Imaging Results              MRI Lumbar Spine Without Contrast (Final result)  Result time 01/20/23 14:50:01      Final result by Nicole Valerio MD (01/20/23 14:50:01)                   Impression:      1. Right central disc protrusion at L5-S1 impinges on the descending right S1 nerve roots.  2. Mild neural foraminal stenoses as described.      Electronically signed by: Nicole Valerio  Date:    01/20/2023  Time:    14:50               Narrative:    EXAMINATION:  MRI LUMBAR SPINE WITHOUT CONTRAST    CLINICAL HISTORY:  Low back pain, no red flags, no prior management;    TECHNIQUE:  Multiplanar multisequence MR images of the lumbar spine are obtained without contrast.    COMPARISON:  CT lumbar spine dated 06/01/2022    FINDINGS:  There are 5 non-rib-bearing lumbar type vertebral bodies.  Alignment is normal.  The vertebral body heights are maintained.  The bone marrow is normal in signal.    The conus terminates at the level of L1.  It is normal in signal  and contour.  There is no epidural fluid collection.    Disc spaces, spinal canal and neural foramina are as follows:    L1-L2: Minimal disc bulge and facet hypertrophy.  No significant spinal canal or neural foraminal stenosis.    L2-L3: Minimal disc bulge and facet hypertrophy.  No significant spinal canal or neural foraminal stenosis.    L3-L4: No disc herniation.  Bilateral facet hypertrophy.  No significant spinal canal or neural foraminal stenosis.    L4-L5: Disc bulge with annular fissure and facet hypertrophy.  No significant spinal canal stenosis.  Mild bilateral neural foraminal stenosis.    L5-S1: Disc bulge with right central disc protrusion, measuring 10 x 10 mm in size, which impinges on the descending right S1 nerve roots (series 7, image 38).  Mild bilateral neural foraminal stenosis.    No significant abnormality within the visualized paraspinous musculature.                                  Medical Decision Making  Patient presents with an acute severe exacerbation of his chronic pain    Amount and/or Complexity of Data Reviewed  Independent Historian: spouse     Details: discussed case with spouse  External Data Reviewed: radiology.     Details: I reviewed the patient's most recent CT of the lumbar spine showing a large bulging disc pressing on the thecal sac  Radiology: ordered and independent interpretation performed. Decision-making details documented in ED Course.     Details: I see a large disc protrusion at L5/S1  Discussion of management or test interpretation with external provider(s): I called and spoke with mildly the  at [facility] asking if she can arrange for this patient to have a neurosurgical evaluation at the closest facility for patients who do not have insurance.  She states that she is able to do this but that an MRI must be obtained before the neurosurgical clinic will accept the referral.  I called and spoke to the nursing supervisor who  referred me to the radiology department who told me that MRI is available today, and I ordered the MRI not because there is an emergent indication to do so but because the patient must have the MRI in order to have the neurosurgical referral which he does need.  Consideration for admission to the hospital for neurosurgical evaluation at [facility] was made given the fact that he does have compression of his thecal sac, but this is a chronic problem that is without change for several months.  There is no indication to admit him.  I again discussed the case with mildly at 2:55 p.m. and she states that she will make a referral to neurosurgery who will call the patient with his appointment.  All of this was discussed with the patient and he verbalized understanding    Risk  Prescription drug management.  Risk Details: Narcotic pain medications.            Medications   dexAMETHasone injection 8 mg (8 mg Intramuscular Given 1/20/23 1336)   HYDROmorphone (PF) injection 1 mg (1 mg Intramuscular Given 1/20/23 1337)   ondansetron disintegrating tablet 4 mg (4 mg Oral Given 1/20/23 1336)                              Clinical Impression:   Final diagnoses:  [M54.31] Sciatica, right side (Primary)  [M51.26] Lumbar disc herniation        ED Disposition Condition    Discharge Stable          ED Prescriptions       Medication Sig Dispense Start Date End Date Auth. Provider    predniSONE (DELTASONE) 20 MG tablet Take 1 tablet (20 mg total) by mouth once daily. for 7 days 19 tablet 1/20/2023 1/27/2023 William Dougherty Jr., MD    oxyCODONE-acetaminophen (PERCOCET)  mg per tablet Take 1 tablet by mouth every 6 (six) hours as needed for Pain. 27 tablet 1/20/2023 -- William Dougherty Jr., MD          Follow-up Information    None          William Dougherty Jr., MD  01/20/23 1540

## 2023-01-25 PROBLEM — G89.29 CHRONIC RIGHT-SIDED LOW BACK PAIN WITH RIGHT-SIDED SCIATICA: Status: ACTIVE | Noted: 2023-01-25

## 2023-01-25 PROBLEM — M51.26 LUMBAR DISC HERNIATION: Status: ACTIVE | Noted: 2023-01-25

## 2023-01-25 PROBLEM — M54.41 CHRONIC RIGHT-SIDED LOW BACK PAIN WITH RIGHT-SIDED SCIATICA: Status: ACTIVE | Noted: 2023-01-25

## 2023-01-26 ENCOUNTER — HOSPITAL ENCOUNTER (EMERGENCY)
Facility: HOSPITAL | Age: 48
Discharge: HOME | End: 2023-01-26
Attending: FAMILY MEDICINE
Payer: MEDICAID

## 2023-01-26 VITALS
DIASTOLIC BLOOD PRESSURE: 107 MMHG | WEIGHT: 190 LBS | BODY MASS INDEX: 25.73 KG/M2 | HEART RATE: 62 BPM | TEMPERATURE: 98 F | OXYGEN SATURATION: 97 % | HEIGHT: 72 IN | SYSTOLIC BLOOD PRESSURE: 174 MMHG | RESPIRATION RATE: 20 BRPM

## 2023-01-26 DIAGNOSIS — G89.29 CHRONIC MIDLINE LOW BACK PAIN WITH RIGHT-SIDED SCIATICA: Primary | ICD-10-CM

## 2023-01-26 DIAGNOSIS — M54.41 CHRONIC MIDLINE LOW BACK PAIN WITH RIGHT-SIDED SCIATICA: Primary | ICD-10-CM

## 2023-01-26 PROCEDURE — 99283 EMERGENCY DEPT VISIT LOW MDM: CPT

## 2023-01-26 PROCEDURE — 25000003 PHARM REV CODE 250

## 2023-01-26 RX ORDER — HYDROCODONE BITARTRATE AND ACETAMINOPHEN 10; 325 MG/1; MG/1
1 TABLET ORAL EVERY 8 HOURS PRN
Qty: 12 TABLET | Refills: 0 | Status: SHIPPED | OUTPATIENT
Start: 2023-01-26 | End: 2023-01-30

## 2023-01-26 RX ORDER — HYDROCODONE BITARTRATE AND ACETAMINOPHEN 10; 325 MG/1; MG/1
1 TABLET ORAL EVERY 8 HOURS PRN
Qty: 12 TABLET | Refills: 0 | Status: SHIPPED | OUTPATIENT
Start: 2023-01-26 | End: 2023-01-26 | Stop reason: SDUPTHER

## 2023-01-26 RX ORDER — OXYCODONE AND ACETAMINOPHEN 5; 325 MG/1; MG/1
1 TABLET ORAL
Status: COMPLETED | OUTPATIENT
Start: 2023-01-26 | End: 2023-01-26

## 2023-01-26 RX ADMIN — OXYCODONE HYDROCHLORIDE AND ACETAMINOPHEN 1 TABLET: 5; 325 TABLET ORAL at 11:01

## 2023-01-26 NOTE — Clinical Note
"Helder"Silvana Fish was seen and treated in our emergency department on 1/26/2023.  He may return to work on 01/28/2023.  Alternate heat and cool compresses.      If you have any questions or concerns, please don't hesitate to call.      Johnathon Urbina MD"

## 2023-01-26 NOTE — ED PROVIDER NOTES
"Encounter Date: 1/26/2023       History     Chief Complaint   Patient presents with    Back Pain     Complains of chronic lower back pain, is scheduled to have a surgery on February 7, 2023, and is hurting. Reports  "NP can't prescribe him the right medications for his back"     47 year old male presents to the ER with complaints of lower back pain.  Pain is chronic in nature, scheduled to have surgery on Feb.7, 2023.  Pt ambulatory gait steady.  Reports numbness and tingling to the right leg.  No foot drop.. No CVA tenderness with palpation.  Equal leg lifts.  Pain in centrally located around coccyx radiating down right leg.  Dorsal pedalis pulses present, no obvious trauma.     The history is provided by the patient. No  was used.   Review of patient's allergies indicates:  No Known Allergies  Past Medical History:   Diagnosis Date    Arthritis     Back pain     chronic    Hypertension      History reviewed. No pertinent surgical history.  No family history on file.  Social History     Tobacco Use    Smoking status: Never    Smokeless tobacco: Never   Substance Use Topics    Alcohol use: Yes     Comment: occas     Review of Systems   Constitutional: Negative.    HENT: Negative.     Eyes: Negative.    Respiratory: Negative.     Cardiovascular: Negative.    Gastrointestinal: Negative.    Endocrine: Negative.    Musculoskeletal:  Positive for back pain.   Skin: Negative.    Allergic/Immunologic: Negative.    Neurological: Negative.    Hematological: Negative.    Psychiatric/Behavioral: Negative.     All other systems reviewed and are negative.    Physical Exam     Initial Vitals [01/26/23 1039]   BP Pulse Resp Temp SpO2   (!) 174/107 62 18 98.2 °F (36.8 °C) 97 %      MAP       --         Physical Exam    Nursing note and vitals reviewed.  Constitutional: He appears well-developed and well-nourished.   HENT:   Head: Normocephalic and atraumatic.   Right Ear: External ear normal.   Left Ear: " External ear normal.   Nose: Nose normal.   Mouth/Throat: Oropharynx is clear and moist.   Eyes: EOM are normal. Pupils are equal, round, and reactive to light.   Neck: Neck supple.   Normal range of motion.  Cardiovascular:  Normal rate, regular rhythm, normal heart sounds and intact distal pulses.           Pulmonary/Chest: Breath sounds normal.   Abdominal: Abdomen is soft.   Musculoskeletal:      Cervical back: Normal range of motion and neck supple.      Comments: Pt with history of herniated disc scheduled for surgery on Feb 7th.       Neurological: He is alert and oriented to person, place, and time. He has normal strength and normal reflexes.   Skin: Skin is warm and dry.   Psychiatric: He has a normal mood and affect.       ED Course   Procedures  Labs Reviewed - No data to display       Imaging Results    None          Medications   oxyCODONE-acetaminophen 5-325 mg per tablet 1 tablet (1 tablet Oral Given 1/26/23 1124)     Medical Decision Making:   ED Management:  Discussed with patient the need to find a primary care provider.  Pt reports he cannot afford a pain management md.  Discussed frequent pain medications refills.  Informed pt he will get a 3 day supply of Norco for pain.   Other:   I discussed test(s) with the performing physician.                        Clinical Impression:   Final diagnoses:  [M54.41, G89.29] Chronic midline low back pain with right-sided sciatica (Primary)        ED Disposition Condition    Discharge Stable          ED Prescriptions       Medication Sig Dispense Start Date End Date Auth. Provider    HYDROcodone-acetaminophen (NORCO)  mg per tablet  (Status: Discontinued) Take 1 tablet by mouth every 8 (eight) hours as needed for Pain. 12 tablet 1/26/2023 1/26/2023 Marianela Lopez NP    HYDROcodone-acetaminophen (NORCO)  mg per tablet  (Status: Discontinued) Take 1 tablet by mouth every 8 (eight) hours as needed for Pain. 12 tablet 1/26/2023 1/26/2023 Marianela Lopez NP     HYDROcodone-acetaminophen (NORCO)  mg per tablet Take 1 tablet by mouth every 8 (eight) hours as needed for Pain. 12 tablet 1/26/2023 1/30/2023 LUCIE Mcclendon          Follow-up Information    None          Marianela Lopez NP  01/26/23 1141

## 2023-02-08 PROBLEM — M54.16 LUMBAR BACK PAIN WITH RADICULOPATHY AFFECTING LOWER EXTREMITY: Status: ACTIVE | Noted: 2023-02-08

## 2023-02-16 ENCOUNTER — HOSPITAL ENCOUNTER (EMERGENCY)
Facility: HOSPITAL | Age: 48
Discharge: HOME | End: 2023-02-16
Attending: EMERGENCY MEDICINE
Payer: MEDICAID

## 2023-02-16 VITALS
HEIGHT: 74 IN | TEMPERATURE: 98 F | DIASTOLIC BLOOD PRESSURE: 88 MMHG | SYSTOLIC BLOOD PRESSURE: 129 MMHG | HEART RATE: 54 BPM | RESPIRATION RATE: 18 BRPM | BODY MASS INDEX: 24.51 KG/M2 | OXYGEN SATURATION: 95 % | WEIGHT: 191 LBS

## 2023-02-16 DIAGNOSIS — T81.49XA SUPERFICIAL POSTOPERATIVE WOUND INFECTION: Primary | ICD-10-CM

## 2023-02-16 LAB
ALBUMIN SERPL-MCNC: 4.5 G/DL (ref 3.5–5)
ALBUMIN/GLOB SERPL: 1.3 RATIO (ref 1.1–2)
ALP SERPL-CCNC: 74 UNIT/L (ref 40–150)
ALT SERPL-CCNC: 25 UNIT/L (ref 0–55)
AST SERPL-CCNC: 17 UNIT/L (ref 5–34)
BASOPHILS # BLD AUTO: 0.04 X10(3)/MCL (ref 0–0.2)
BASOPHILS NFR BLD AUTO: 0.4 %
BILIRUBIN DIRECT+TOT PNL SERPL-MCNC: 0.6 MG/DL
BUN SERPL-MCNC: 21.5 MG/DL (ref 8.9–20.6)
CALCIUM SERPL-MCNC: 10.3 MG/DL (ref 8.4–10.2)
CHLORIDE SERPL-SCNC: 103 MMOL/L (ref 98–107)
CO2 SERPL-SCNC: 24 MMOL/L (ref 22–29)
CREAT SERPL-MCNC: 1.15 MG/DL (ref 0.73–1.18)
EOSINOPHIL # BLD AUTO: 0.33 X10(3)/MCL (ref 0–0.9)
EOSINOPHIL NFR BLD AUTO: 3.5 %
ERYTHROCYTE [DISTWIDTH] IN BLOOD BY AUTOMATED COUNT: 14.7 % (ref 11.5–17)
GFR SERPLBLD CREATININE-BSD FMLA CKD-EPI: >60 MLS/MIN/1.73/M2
GLOBULIN SER-MCNC: 3.4 GM/DL (ref 2.4–3.5)
GLUCOSE SERPL-MCNC: 127 MG/DL (ref 74–100)
HCT VFR BLD AUTO: 39.6 % (ref 42–52)
HGB BLD-MCNC: 12.7 GM/DL (ref 14–18)
HYPOCHROMIA BLD QL SMEAR: ABNORMAL
IMM GRANULOCYTES # BLD AUTO: 0.04 X10(3)/MCL (ref 0–0.04)
IMM GRANULOCYTES NFR BLD AUTO: 0.4 %
LACTATE SERPL-SCNC: 1.8 MMOL/L (ref 0.5–2.2)
LYMPHOCYTES # BLD AUTO: 1.25 X10(3)/MCL (ref 0.6–4.6)
LYMPHOCYTES NFR BLD AUTO: 13.2 %
MCH RBC QN AUTO: 22 PG
MCHC RBC AUTO-ENTMCNC: 32.1 MG/DL (ref 33–36)
MCV RBC AUTO: 68.5 FL (ref 80–94)
MICROCYTES BLD QL SMEAR: ABNORMAL
MONOCYTES # BLD AUTO: 0.73 X10(3)/MCL (ref 0.1–1.3)
MONOCYTES NFR BLD AUTO: 7.7 %
NEUTROPHILS # BLD AUTO: 7.08 X10(3)/MCL (ref 2.1–9.2)
NEUTROPHILS NFR BLD AUTO: 74.8 %
NRBC BLD AUTO-RTO: 0 %
PLATELET # BLD AUTO: 336 X10(3)/MCL (ref 130–400)
PLATELET # BLD EST: ADEQUATE 10*3/UL
PMV BLD AUTO: 9.6 FL (ref 7.4–10.4)
POLYCHROMASIA BLD QL SMEAR: SLIGHT
POTASSIUM SERPL-SCNC: 4.2 MMOL/L (ref 3.5–5.1)
PROT SERPL-MCNC: 7.9 GM/DL (ref 6.4–8.3)
RBC # BLD AUTO: 5.78 X10(6)/MCL (ref 4.7–6.1)
RBC MORPH BLD: ABNORMAL
SODIUM SERPL-SCNC: 139 MMOL/L (ref 136–145)
STIPPLED RBC (OHS): SLIGHT
WBC # SPEC AUTO: 9.5 X10(3)/MCL (ref 4.5–11.5)

## 2023-02-16 PROCEDURE — 99284 EMERGENCY DEPT VISIT MOD MDM: CPT | Mod: 25

## 2023-02-16 PROCEDURE — 96374 THER/PROPH/DIAG INJ IV PUSH: CPT

## 2023-02-16 PROCEDURE — 96375 TX/PRO/DX INJ NEW DRUG ADDON: CPT

## 2023-02-16 PROCEDURE — 80053 COMPREHEN METABOLIC PANEL: CPT | Performed by: EMERGENCY MEDICINE

## 2023-02-16 PROCEDURE — 63600175 PHARM REV CODE 636 W HCPCS: Performed by: EMERGENCY MEDICINE

## 2023-02-16 PROCEDURE — 83605 ASSAY OF LACTIC ACID: CPT | Performed by: EMERGENCY MEDICINE

## 2023-02-16 PROCEDURE — 85025 COMPLETE CBC W/AUTO DIFF WBC: CPT | Performed by: EMERGENCY MEDICINE

## 2023-02-16 RX ORDER — ONDANSETRON 2 MG/ML
4 INJECTION INTRAMUSCULAR; INTRAVENOUS
Status: COMPLETED | OUTPATIENT
Start: 2023-02-16 | End: 2023-02-16

## 2023-02-16 RX ORDER — KETOROLAC TROMETHAMINE 30 MG/ML
30 INJECTION, SOLUTION INTRAMUSCULAR; INTRAVENOUS
Status: COMPLETED | OUTPATIENT
Start: 2023-02-16 | End: 2023-02-16

## 2023-02-16 RX ORDER — MORPHINE SULFATE 4 MG/ML
4 INJECTION, SOLUTION INTRAMUSCULAR; INTRAVENOUS
Status: COMPLETED | OUTPATIENT
Start: 2023-02-16 | End: 2023-02-16

## 2023-02-16 RX ORDER — HYDROCODONE BITARTRATE AND ACETAMINOPHEN 5; 325 MG/1; MG/1
1 TABLET ORAL EVERY 6 HOURS PRN
Qty: 20 TABLET | Refills: 0 | Status: SHIPPED | OUTPATIENT
Start: 2023-02-16 | End: 2023-02-21

## 2023-02-16 RX ORDER — CEPHALEXIN 500 MG/1
500 CAPSULE ORAL EVERY 12 HOURS
Qty: 14 CAPSULE | Refills: 0 | Status: SHIPPED | OUTPATIENT
Start: 2023-02-16 | End: 2023-02-23

## 2023-02-16 RX ADMIN — MORPHINE SULFATE 4 MG: 4 INJECTION INTRAVENOUS at 09:02

## 2023-02-16 RX ADMIN — ONDANSETRON HYDROCHLORIDE 4 MG: 2 SOLUTION INTRAMUSCULAR; INTRAVENOUS at 09:02

## 2023-02-16 RX ADMIN — KETOROLAC TROMETHAMINE 30 MG: 30 INJECTION, SOLUTION INTRAMUSCULAR; INTRAVENOUS at 09:02

## 2023-02-16 NOTE — ED PROVIDER NOTES
Encounter Date: 2/16/2023       History     Chief Complaint   Patient presents with    Back Problem     Pt relates that he had back surgery in [place] 2/7/23 and is now experiencing pain with  n/v, and leakage from back at onset yesterday     The history is provided by the patient. No  was used.   Back Pain   This is a new problem. The current episode started two days ago. The problem has been gradually worsening. Associated with: s/p L5-S1 minimally invasive diskectomy 2/7 at [facility]. The pain is present in the lumbar spine. The pain is The same all the time. Pertinent negatives include no chest pain, no fever, no bowel incontinence, no dysuria, no leg pain, no paresthesias, no paresis, no tingling and no weakness.   Notes wound drainage, onset this AM.  Also experienced N/V this AM.    Review of patient's allergies indicates:  No Known Allergies  Past Medical History:   Diagnosis Date    Arthritis     Back pain     chronic    Hypertension      Past Surgical History:   Procedure Laterality Date    MINIMALLY INVASIVE SURGICAL REMOVAL OF INTERVERTEBRAL DISC OF SPINE USING MICROSCOPE Right 2/7/2023    Procedure: DISCECTOMY, SPINE, MINIMALLY INVASIVE, USING MICROSCOPE, Right L5-S1;  Surgeon: Aleida Coates MD;  Location: [facility];  Service: Neurosurgery;  Laterality: Right;     No family history on file.  Social History     Tobacco Use    Smoking status: Never    Smokeless tobacco: Never   Substance Use Topics    Alcohol use: Yes     Alcohol/week: 6.0 standard drinks     Types: 6 Drinks containing 0.5 oz of alcohol per week     Comment: occas    Drug use: Never     Review of Systems   Constitutional:  Negative for fever.   HENT:  Negative for sore throat.    Respiratory:  Negative for shortness of breath.    Cardiovascular:  Negative for chest pain.   Gastrointestinal:  Negative for bowel incontinence and nausea.   Genitourinary:  Negative for dysuria.   Musculoskeletal:  Positive  for back pain.   Skin:  Negative for rash.   Neurological:  Negative for tingling, weakness and paresthesias.   Hematological:  Does not bruise/bleed easily.     Physical Exam     Initial Vitals [02/16/23 0905]   BP Pulse Resp Temp SpO2   (!) 154/103 68 18 98.2 °F (36.8 °C) 100 %      MAP       --         Physical Exam    Nursing note and vitals reviewed.  Constitutional: He appears well-developed and well-nourished.   HENT:   Head: Normocephalic and atraumatic.   Right Ear: External ear normal.   Left Ear: External ear normal.   Nose: Nose normal.   Eyes: Conjunctivae and EOM are normal. Pupils are equal, round, and reactive to light.   Neck: Neck supple.   Normal range of motion.  Cardiovascular:  Normal rate, regular rhythm, normal heart sounds and intact distal pulses.           Pulmonary/Chest: Breath sounds normal.   Abdominal: Abdomen is soft. Bowel sounds are normal.   Musculoskeletal:         General: Normal range of motion.      Cervical back: Normal range of motion and neck supple.        Back:      Neurological: He is alert and oriented to person, place, and time. He has normal strength. GCS score is 15. GCS eye subscore is 4. GCS verbal subscore is 5. GCS motor subscore is 6.   Skin: Skin is warm and dry. Capillary refill takes less than 2 seconds.   Psychiatric: He has a normal mood and affect. His behavior is normal. Judgment and thought content normal.       ED Course   Procedures  Labs Reviewed   COMPREHENSIVE METABOLIC PANEL - Abnormal; Notable for the following components:       Result Value    Glucose Level 127 (*)     Blood Urea Nitrogen 21.5 (*)     Calcium Level Total 10.3 (*)     All other components within normal limits   CBC WITH DIFFERENTIAL - Abnormal; Notable for the following components:    Hgb 12.7 (*)     Hct 39.6 (*)     MCV 68.5 (*)     MCHC 32.1 (*)     All other components within normal limits   BLOOD SMEAR MICROSCOPIC EXAM (OLG) - Abnormal; Notable for the following components:     RBC Morph Abnormal (*)     Hypochrom 1+ (*)     Microcyte 3+ (*)     Polychrom Slight (*)     Stippled RBC Slight (*)     All other components within normal limits   LACTIC ACID, PLASMA - Normal   CBC W/ AUTO DIFFERENTIAL    Narrative:     The following orders were created for panel order CBC auto differential.  Procedure                               Abnormality         Status                     ---------                               -----------         ------                     CBC with Differential[[id number]]        Abnormal            Final result                 Please view results for these tests on the individual orders.   PATHOLOGIST INTERPRETATION          Imaging Results    None          Medications   morphine injection 4 mg (4 mg Intravenous Given 2/16/23 0950)   ondansetron injection 4 mg (4 mg Intravenous Given 2/16/23 0949)   ketorolac injection 30 mg (30 mg Intravenous Given 2/16/23 0949)                 ED Course as of 02/16/23 1124   Thu Feb 16, 2023   1015 WBC, lactate normal.  I suspect a superficial wound infection.  Dr. Jason (\[facility]\"" neurosurgery) paged to coordinate care. [CL]   1043 I spoke with Dr. Jason - asks to upload image of wound into Epic for her to view and will call back with recommendations. [CL]   1120 Dr. Jason advises to D/C on Keflex 500 mg PO BID x 7 days and that clinic will be contacting pt to move his appointment up to 2/22. [CL]      ED Course User Index  [CL] Tacho Starkey MD     Pt presents 9 days after lumbar surgery with wound drainage and increasing pain.  Differential includes: superficial infection, CSF leak, abscess, hematoma.  Will check CBC and give analgesics and antiemetics.  If WBC elevated, may need imaging to r/o abscess.  Pt states radicular pain is no longer present since surgery, but incisional pain has worsened over the past few days.            Clinical Impression:   Final diagnoses:  [T81.49XA] Superficial postoperative wound  infection (Primary)        ED Disposition Condition    Discharge Stable          ED Prescriptions       Medication Sig Dispense Start Date End Date Auth. Provider    HYDROcodone-acetaminophen (NORCO) 5-325 mg per tablet Take 1 tablet by mouth every 6 (six) hours as needed for Pain. 20 tablet 2/16/2023 2/21/2023 Tacho Starkey MD    cephALEXin (KEFLEX) 500 MG capsule Take 1 capsule (500 mg total) by mouth every 12 (twelve) hours. for 7 days 14 capsule 2/16/2023 2/23/2023 Tacho Starkey MD          Follow-up Information       Follow up With Specialties Details Why Contact Info    The clinic will contact you about moving your appointment up to February 22.                 Tacho Starkey MD  02/16/23 6756

## 2023-02-16 NOTE — ED TRIAGE NOTES
Pt relates that he had back surgery in [place] 2/7/23 and is now experiencing pain with  n/v, and leakage from back at onset yesterday

## 2023-02-20 LAB — VIEW PATHOLOGY REPORT (RELIAPATH): NORMAL

## 2023-02-21 PROBLEM — M51.369 DEGENERATIVE DISC DISEASE, LUMBAR: Status: ACTIVE | Noted: 2023-02-21

## 2023-02-21 PROBLEM — M51.36 DEGENERATIVE DISC DISEASE, LUMBAR: Status: ACTIVE | Noted: 2023-02-21

## 2023-04-12 ENCOUNTER — HOSPITAL ENCOUNTER (EMERGENCY)
Facility: HOSPITAL | Age: 48
Discharge: HOME | End: 2023-04-12
Attending: FAMILY MEDICINE
Payer: MEDICAID

## 2023-04-12 VITALS
DIASTOLIC BLOOD PRESSURE: 89 MMHG | OXYGEN SATURATION: 98 % | WEIGHT: 190 LBS | RESPIRATION RATE: 20 BRPM | HEART RATE: 64 BPM | HEIGHT: 74 IN | TEMPERATURE: 98 F | BODY MASS INDEX: 24.38 KG/M2 | SYSTOLIC BLOOD PRESSURE: 138 MMHG

## 2023-04-12 DIAGNOSIS — L04.9 ADENITIS, ACUTE: Primary | ICD-10-CM

## 2023-04-12 LAB
ANION GAP SERPL CALC-SCNC: 11 MEQ/L
APPEARANCE UR: CLEAR
BACTERIA #/AREA URNS AUTO: NORMAL /HPF
BASOPHILS # BLD AUTO: 0.04 X10(3)/MCL (ref 0–0.2)
BASOPHILS NFR BLD AUTO: 0.7 %
BILIRUB UR QL STRIP.AUTO: NEGATIVE MG/DL
BUN SERPL-MCNC: 17.7 MG/DL (ref 8.9–20.6)
CALCIUM SERPL-MCNC: 10.1 MG/DL (ref 8.4–10.2)
CHLORIDE SERPL-SCNC: 103 MMOL/L (ref 98–107)
CO2 SERPL-SCNC: 24 MMOL/L (ref 22–29)
COLOR UR AUTO: YELLOW
CREAT SERPL-MCNC: 1.09 MG/DL (ref 0.73–1.18)
CREAT/UREA NIT SERPL: 16
EOSINOPHIL # BLD AUTO: 0.34 X10(3)/MCL (ref 0–0.9)
EOSINOPHIL NFR BLD AUTO: 5.9 %
ERYTHROCYTE [DISTWIDTH] IN BLOOD BY AUTOMATED COUNT: 14.8 % (ref 11.5–17)
GFR SERPLBLD CREATININE-BSD FMLA CKD-EPI: >60 MLS/MIN/1.73/M2
GLUCOSE SERPL-MCNC: 116 MG/DL (ref 74–100)
GLUCOSE UR QL STRIP.AUTO: NEGATIVE MG/DL
HCT VFR BLD AUTO: 41.7 % (ref 42–52)
HGB BLD-MCNC: 13 G/DL (ref 14–18)
IMM GRANULOCYTES # BLD AUTO: 0.01 X10(3)/MCL (ref 0–0.04)
IMM GRANULOCYTES NFR BLD AUTO: 0.2 %
KETONES UR QL STRIP.AUTO: NEGATIVE MG/DL
LEUKOCYTE ESTERASE UR QL STRIP.AUTO: NEGATIVE UNIT/L
LYMPHOCYTES # BLD AUTO: 1.26 X10(3)/MCL (ref 0.6–4.6)
LYMPHOCYTES NFR BLD AUTO: 22 %
MCH RBC QN AUTO: 21.4 PG (ref 27–31)
MCHC RBC AUTO-ENTMCNC: 31.2 G/DL (ref 33–36)
MCV RBC AUTO: 68.7 FL (ref 80–94)
MONOCYTES # BLD AUTO: 0.53 X10(3)/MCL (ref 0.1–1.3)
MONOCYTES NFR BLD AUTO: 9.3 %
NEUTROPHILS # BLD AUTO: 3.54 X10(3)/MCL (ref 2.1–9.2)
NEUTROPHILS NFR BLD AUTO: 61.9 %
NITRITE UR QL STRIP.AUTO: NEGATIVE
PH UR STRIP.AUTO: 5.5 [PH]
PLATELET # BLD AUTO: 265 X10(3)/MCL (ref 130–400)
PMV BLD AUTO: 9.5 FL (ref 7.4–10.4)
POTASSIUM SERPL-SCNC: 4.2 MMOL/L (ref 3.5–5.1)
PROT UR QL STRIP.AUTO: NEGATIVE MG/DL
RBC # BLD AUTO: 6.07 X10(6)/MCL (ref 4.7–6.1)
RBC #/AREA URNS AUTO: NORMAL /HPF
RBC UR QL AUTO: NEGATIVE UNIT/L
SODIUM SERPL-SCNC: 138 MMOL/L (ref 136–145)
SP GR UR STRIP.AUTO: 1.02
SQUAMOUS #/AREA URNS AUTO: NORMAL /HPF
UROBILINOGEN UR STRIP-ACNC: 0.2 MG/DL
WBC # SPEC AUTO: 5.7 X10(3)/MCL (ref 4.5–11.5)
WBC #/AREA URNS AUTO: NORMAL /HPF

## 2023-04-12 PROCEDURE — 81001 URINALYSIS AUTO W/SCOPE: CPT | Performed by: FAMILY MEDICINE

## 2023-04-12 PROCEDURE — 99283 EMERGENCY DEPT VISIT LOW MDM: CPT

## 2023-04-12 PROCEDURE — 85025 COMPLETE CBC W/AUTO DIFF WBC: CPT | Performed by: FAMILY MEDICINE

## 2023-04-12 PROCEDURE — 80048 BASIC METABOLIC PNL TOTAL CA: CPT | Performed by: FAMILY MEDICINE

## 2023-04-12 RX ORDER — CLINDAMYCIN HYDROCHLORIDE 300 MG/1
300 CAPSULE ORAL EVERY 8 HOURS
Qty: 21 CAPSULE | Refills: 0 | Status: SHIPPED | OUTPATIENT
Start: 2023-04-12 | End: 2023-04-19

## 2023-04-12 NOTE — ED PROVIDER NOTES
Encounter Date: 4/12/2023       History     Chief Complaint   Patient presents with    llq abd pain with lump     started with pain with lump x1week     48-year-old noticed a knot in his left groin for about a week has gotten larger in his tender no fevers no chills no history of infections that he can recall on that leg no testicle pain no abdominal pain      Review of patient's allergies indicates:  No Known Allergies  Past Medical History:   Diagnosis Date    Arthritis     Back pain     chronic    Hypertension      Past Surgical History:   Procedure Laterality Date    MINIMALLY INVASIVE SURGICAL REMOVAL OF INTERVERTEBRAL DISC OF SPINE USING MICROSCOPE Right 2/7/2023    Procedure: DISCECTOMY, SPINE, MINIMALLY INVASIVE, USING MICROSCOPE, Right L5-S1;  Surgeon: Aleida Coates MD;  Location: [facility] MAIN OR;  Service: Neurosurgery;  Laterality: Right;     No family history on file.  Social History     Tobacco Use    Smoking status: Never    Smokeless tobacco: Never   Substance Use Topics    Alcohol use: Yes     Alcohol/week: 6.0 standard drinks     Types: 6 Drinks containing 0.5 oz of alcohol per week     Comment: occas    Drug use: Never     Review of Systems   Constitutional:  Negative for fever.   HENT:  Negative for sore throat.    Respiratory:  Negative for shortness of breath.    Cardiovascular:  Negative for chest pain.   Gastrointestinal:  Negative for nausea.   Genitourinary:  Negative for dysuria.   Musculoskeletal:  Negative for back pain.   Skin:  Negative for rash.   Neurological:  Negative for weakness.   Hematological:  Positive for adenopathy. Does not bruise/bleed easily.   All other systems reviewed and are negative.    Physical Exam     Initial Vitals [04/12/23 0735]   BP Pulse Resp Temp SpO2   138/89 64 20 98.2 °F (36.8 °C) 98 %      MAP       --         Physical Exam    Nursing note and vitals reviewed.  Constitutional: He appears well-developed and well-nourished. He is active.   HENT:   Head:  Normocephalic and atraumatic.   Eyes: Conjunctivae, EOM and lids are normal. Pupils are equal, round, and reactive to light.   Neck: Trachea normal and phonation normal. Neck supple. No thyroid mass present.   Normal range of motion.  Cardiovascular:  Normal rate, regular rhythm, normal heart sounds and normal pulses.           Pulmonary/Chest: Breath sounds normal.   Abdominal: Abdomen is soft. Bowel sounds are normal.   Genitourinary:    Genitourinary Comments: Palpable inguinal lymph node on the left that is tender to palpation no fluctuance no evidence of abscess no hernias noted     Musculoskeletal:         General: Normal range of motion.      Cervical back: Normal range of motion and neck supple.     Neurological: He is alert.   Skin: Skin is warm and intact.   Psychiatric: He has a normal mood and affect. His speech is normal and behavior is normal. Judgment and thought content normal. Cognition and memory are normal.       ED Course   Procedures  Labs Reviewed   BASIC METABOLIC PANEL - Abnormal; Notable for the following components:       Result Value    Glucose Level 116 (*)     All other components within normal limits   CBC WITH DIFFERENTIAL - Abnormal; Notable for the following components:    Hgb 13.0 (*)     Hct 41.7 (*)     MCV 68.7 (*)     MCH 21.4 (*)     MCHC 31.2 (*)     All other components within normal limits   URINALYSIS, MICROSCOPIC - Normal   CBC W/ AUTO DIFFERENTIAL    Narrative:     The following orders were created for panel order CBC Auto Differential.  Procedure                               Abnormality         Status                     ---------                               -----------         ------                     CBC with Differential[[id number]]        Abnormal            Final result                 Please view results for these tests on the individual orders.   URINALYSIS, REFLEX TO URINE CULTURE          Imaging Results    None          Medications - No data to  display  Medical Decision Making:   Initial Assessment:   48-year-old presents with an inflamed lymph node left groin area no penile discharge no dysuria no testicle pain no evidence of infection  Differential Diagnosis:   Differential diagnosis adenitis secondary to urinary tract infection or skin infection versus STD versus lymphatic disorder  Clinical Tests:   Lab Tests: Ordered and Reviewed  The following lab test(s) were unremarkable: CBC, Urinalysis and BMP  ED Management:  ED management consisted of laboratory workup and exam no evidence of abscess severe infection we will discharge home with some antibiotics and follow up with his PCP in a week           ED Course as of 04/12/23 0908   Wed Apr 12, 2023   0839 WBC: 5.7 [BL]   0839 Hemoglobin(!): 13.0 [BL]   0839 Hematocrit(!): 41.7 [BL]      ED Course User Index  [BL] Kevin Curiel MD                 Clinical Impression:   Final diagnoses:  [L04.9] Adenitis, acute (Primary)        ED Disposition Condition    Discharge Stable          ED Prescriptions       Medication Sig Dispense Start Date End Date Auth. Provider    clindamycin (CLEOCIN) 300 MG capsule Take 1 capsule (300 mg total) by mouth every 8 (eight) hours. for 7 days 21 capsule 4/12/2023 4/19/2023 Kevin Curiel MD          Follow-up Information       Follow up With Specialties Details Why Contact Info    [facility] - Emergency Dept Emergency Medicine  As needed [address] [facility] [id number] [phone]             Kevin Curiel MD  04/12/23 0908

## 2023-04-12 NOTE — Clinical Note
"Helder Phillipssarah Fish was seen and treated in our emergency department on 4/12/2023.  He may return to work on 04/13/2023.  Per Dr Curiel      If you have any questions or concerns, please don't hesitate to call.      ab, RN    "

## 2023-04-12 NOTE — ED NOTES
"Pt presents with left groin pain x1wk; states he feels "a little lump" also; no redness/swelling noted to area; denies any urinary complaints; does report nausea and vomited x1 this morning;   "

## 2024-02-14 ENCOUNTER — HOSPITAL ENCOUNTER (EMERGENCY)
Facility: HOSPITAL | Age: 49
Discharge: HOME | End: 2024-02-14
Attending: FAMILY MEDICINE

## 2024-02-14 VITALS
BODY MASS INDEX: 24.38 KG/M2 | SYSTOLIC BLOOD PRESSURE: 146 MMHG | RESPIRATION RATE: 18 BRPM | TEMPERATURE: 98 F | DIASTOLIC BLOOD PRESSURE: 92 MMHG | HEART RATE: 63 BPM | OXYGEN SATURATION: 99 % | WEIGHT: 190 LBS | HEIGHT: 74 IN

## 2024-02-14 DIAGNOSIS — M54.42 ACUTE BILATERAL LOW BACK PAIN WITH BILATERAL SCIATICA: Primary | ICD-10-CM

## 2024-02-14 DIAGNOSIS — M54.41 ACUTE BILATERAL LOW BACK PAIN WITH BILATERAL SCIATICA: Primary | ICD-10-CM

## 2024-02-14 PROCEDURE — 63600175 PHARM REV CODE 636 W HCPCS: Performed by: FAMILY MEDICINE

## 2024-02-14 PROCEDURE — 99284 EMERGENCY DEPT VISIT MOD MDM: CPT | Mod: 25

## 2024-02-14 PROCEDURE — 96372 THER/PROPH/DIAG INJ SC/IM: CPT | Performed by: FAMILY MEDICINE

## 2024-02-14 RX ORDER — PREDNISONE 50 MG/1
50 TABLET ORAL DAILY
Qty: 5 TABLET | Refills: 0 | Status: SHIPPED | OUTPATIENT
Start: 2024-02-14 | End: 2024-02-19

## 2024-02-14 RX ORDER — DEXAMETHASONE SODIUM PHOSPHATE 4 MG/ML
8 INJECTION, SOLUTION INTRA-ARTICULAR; INTRALESIONAL; INTRAMUSCULAR; INTRAVENOUS; SOFT TISSUE
Status: COMPLETED | OUTPATIENT
Start: 2024-02-14 | End: 2024-02-14

## 2024-02-14 RX ORDER — KETOROLAC TROMETHAMINE 30 MG/ML
60 INJECTION, SOLUTION INTRAMUSCULAR; INTRAVENOUS
Status: COMPLETED | OUTPATIENT
Start: 2024-02-14 | End: 2024-02-14

## 2024-02-14 RX ORDER — DICLOFENAC SODIUM 50 MG/1
50 TABLET, DELAYED RELEASE ORAL 3 TIMES DAILY
Qty: 15 TABLET | Refills: 0 | Status: SHIPPED | OUTPATIENT
Start: 2024-02-14 | End: 2024-02-19

## 2024-02-14 RX ADMIN — DEXAMETHASONE SODIUM PHOSPHATE 8 MG: 4 INJECTION, SOLUTION INTRA-ARTICULAR; INTRALESIONAL; INTRAMUSCULAR; INTRAVENOUS; SOFT TISSUE at 11:02

## 2024-02-14 RX ADMIN — KETOROLAC TROMETHAMINE 60 MG: 30 INJECTION, SOLUTION INTRAMUSCULAR; INTRAVENOUS at 11:02

## 2024-02-14 NOTE — ED PROVIDER NOTES
"Encounter Date: 2/14/2024       History     Chief Complaint   Patient presents with    Back Pain     Chronic lower back pain. He states yesterday it "pinched" and his legs gave on him. Ambulatory to triage. He did have surgery to repair herniated disk about a year ago. Denies loss of urinary or bowel control.      Back pain   49-year-old male patient comes in with chronic low back pain patient had episode yesterday where his legs gave out with this patient had extensive pain today patient is able to ambulate but states he still feels pinching in his low back otherwise patient states that he did have low back surgery with herniated disc repair 1 year ago otherwise does not have any loss of bowel bladder function and no foot drop.        Review of patient's allergies indicates:  No Known Allergies  Past Medical History:   Diagnosis Date    Arthritis     Back pain     chronic    Hypertension      Past Surgical History:   Procedure Laterality Date    MINIMALLY INVASIVE SURGICAL REMOVAL OF INTERVERTEBRAL DISC OF SPINE USING MICROSCOPE Right 2/7/2023    Procedure: DISCECTOMY, SPINE, MINIMALLY INVASIVE, USING MICROSCOPE, Right L5-S1;  Surgeon: Aleida Coates MD;  Location: [facility];  Service: Neurosurgery;  Laterality: Right;     No family history on file.  Social History     Tobacco Use    Smoking status: Never    Smokeless tobacco: Never   Substance Use Topics    Alcohol use: Yes     Alcohol/week: 6.0 standard drinks of alcohol     Types: 6 Drinks containing 0.5 oz of alcohol per week     Comment: occas    Drug use: Never     Review of Systems   Constitutional:  Negative for fever.   HENT:  Negative for sore throat.    Respiratory:  Negative for shortness of breath.    Cardiovascular:  Negative for chest pain.   Gastrointestinal:  Negative for nausea.   Genitourinary:  Negative for dysuria.   Musculoskeletal:  Positive for back pain.   Skin:  Negative for rash.   Neurological:  Negative for weakness. "   Hematological:  Does not bruise/bleed easily.   All other systems reviewed and are negative.      Physical Exam     Initial Vitals [02/14/24 0949]   BP Pulse Resp Temp SpO2   (!) 146/92 63 18 97.7 °F (36.5 °C) 99 %      MAP       --         Physical Exam    Nursing note and vitals reviewed.  Constitutional: He appears well-developed and well-nourished. He is not diaphoretic. No distress.   HENT:   Head: Normocephalic and atraumatic.   Right Ear: External ear normal.   Left Ear: External ear normal.   Nose: Nose normal.   Mouth/Throat: Oropharynx is clear and moist. No oropharyngeal exudate.   Eyes: Conjunctivae and EOM are normal. Pupils are equal, round, and reactive to light. Right eye exhibits no discharge. Left eye exhibits no discharge.   Neck: Neck supple. No thyromegaly present. No tracheal deviation present. No JVD present.   Normal range of motion.  Cardiovascular:  Normal rate, regular rhythm, normal heart sounds and intact distal pulses.     Exam reveals no gallop and no friction rub.       No murmur heard.  Pulmonary/Chest: Breath sounds normal. No stridor. No respiratory distress. He has no wheezes. He has no rhonchi. He has no rales. He exhibits no tenderness.   Abdominal: Abdomen is soft. Bowel sounds are normal. He exhibits no distension. There is no abdominal tenderness. There is no rebound and no guarding.   Musculoskeletal:         General: Tenderness present. No edema. Normal range of motion.      Cervical back: Normal range of motion and neck supple.     Lymphadenopathy:     He has no cervical adenopathy.   Neurological: He is alert and oriented to person, place, and time. He has normal strength and normal reflexes. No cranial nerve deficit or sensory deficit. GCS score is 15. GCS eye subscore is 4. GCS verbal subscore is 5. GCS motor subscore is 6.   Skin: Skin is warm and dry. No rash and no abscess noted. No erythema.   Psychiatric: He has a normal mood and affect. His behavior is normal.  Judgment and thought content normal.         ED Course   Procedures  Labs Reviewed - No data to display       Imaging Results              X-Ray Lumbar Spine Ap And Lateral (In process)                      Medications   dexAMETHasone injection 8 mg (has no administration in time range)   ketorolac injection 60 mg (has no administration in time range)     Medical Decision Making  Disc herniation cauda equina syndrome localized back pain fracture    Amount and/or Complexity of Data Reviewed  Radiology: ordered.    Risk  Prescription drug management.                                      Clinical Impression:  Final diagnoses:  [M54.42, M54.41] Acute bilateral low back pain with bilateral sciatica (Primary)          ED Disposition Condition    Discharge Stable          ED Prescriptions       Medication Sig Dispense Start Date End Date Auth. Provider    predniSONE (DELTASONE) 50 MG Tab Take 1 tablet (50 mg total) by mouth once daily. for 5 days 5 tablet 2/14/2024 2/19/2024 Johnathon Urbina MD    diclofenac (VOLTAREN) 50 MG EC tablet Take 1 tablet (50 mg total) by mouth 3 (three) times daily. for 5 days 15 tablet 2/14/2024 2/19/2024 Johnathon Urbina MD          Follow-up Information    None          Johnathon Urbina MD  02/14/24 1139

## 2024-02-14 NOTE — Clinical Note
"Helder Hernandez" Polina was seen and treated in our emergency department on 2/14/2024.  He may return to work on 02/15/2024.       If you have any questions or concerns, please don't hesitate to call.      Ruthie WELSH RN    "

## 2024-05-15 ENCOUNTER — HOSPITAL ENCOUNTER (EMERGENCY)
Facility: HOSPITAL | Age: 49
Discharge: HOME | End: 2024-05-15
Attending: FAMILY MEDICINE

## 2024-05-15 VITALS
TEMPERATURE: 98 F | BODY MASS INDEX: 24.38 KG/M2 | WEIGHT: 190 LBS | OXYGEN SATURATION: 96 % | SYSTOLIC BLOOD PRESSURE: 162 MMHG | HEART RATE: 53 BPM | DIASTOLIC BLOOD PRESSURE: 99 MMHG | HEIGHT: 74 IN | RESPIRATION RATE: 18 BRPM

## 2024-05-15 DIAGNOSIS — S39.012A LUMBAR STRAIN, INITIAL ENCOUNTER: Primary | ICD-10-CM

## 2024-05-15 PROCEDURE — 63600175 PHARM REV CODE 636 W HCPCS: Performed by: FAMILY MEDICINE

## 2024-05-15 PROCEDURE — 96374 THER/PROPH/DIAG INJ IV PUSH: CPT

## 2024-05-15 PROCEDURE — 96375 TX/PRO/DX INJ NEW DRUG ADDON: CPT

## 2024-05-15 PROCEDURE — 99284 EMERGENCY DEPT VISIT MOD MDM: CPT | Mod: 25

## 2024-05-15 RX ORDER — KETOROLAC TROMETHAMINE 30 MG/ML
30 INJECTION, SOLUTION INTRAMUSCULAR; INTRAVENOUS
Status: COMPLETED | OUTPATIENT
Start: 2024-05-15 | End: 2024-05-15

## 2024-05-15 RX ORDER — FENTANYL CITRATE 50 UG/ML
50 INJECTION, SOLUTION INTRAMUSCULAR; INTRAVENOUS
Status: COMPLETED | OUTPATIENT
Start: 2024-05-15 | End: 2024-05-15

## 2024-05-15 RX ORDER — PREDNISONE 20 MG/1
20 TABLET ORAL DAILY
Qty: 5 TABLET | Refills: 0 | Status: SHIPPED | OUTPATIENT
Start: 2024-05-15 | End: 2024-05-20

## 2024-05-15 RX ORDER — METHOCARBAMOL 500 MG/1
1000 TABLET, FILM COATED ORAL 3 TIMES DAILY
Qty: 30 TABLET | Refills: 0 | Status: SHIPPED | OUTPATIENT
Start: 2024-05-15 | End: 2024-05-20

## 2024-05-15 RX ORDER — DEXAMETHASONE SODIUM PHOSPHATE 4 MG/ML
8 INJECTION, SOLUTION INTRA-ARTICULAR; INTRALESIONAL; INTRAMUSCULAR; INTRAVENOUS; SOFT TISSUE
Status: COMPLETED | OUTPATIENT
Start: 2024-05-15 | End: 2024-05-15

## 2024-05-15 RX ADMIN — FENTANYL CITRATE 50 MCG: 50 INJECTION, SOLUTION INTRAMUSCULAR; INTRAVENOUS at 09:05

## 2024-05-15 RX ADMIN — KETOROLAC TROMETHAMINE 30 MG: 30 INJECTION, SOLUTION INTRAMUSCULAR at 09:05

## 2024-05-15 RX ADMIN — DEXAMETHASONE SODIUM PHOSPHATE 8 MG: 4 INJECTION, SOLUTION INTRA-ARTICULAR; INTRALESIONAL; INTRAMUSCULAR; INTRAVENOUS; SOFT TISSUE at 09:05

## 2024-05-15 NOTE — Clinical Note
"Helder Hernandez" Polina was seen and treated in our emergency department on 5/15/2024.  He may return to work on 05/16/2024.       If you have any questions or concerns, please don't hesitate to call.      Ruthie WELSH RN    "

## 2024-05-15 NOTE — ED PROVIDER NOTES
Encounter Date: 5/15/2024       History     Chief Complaint   Patient presents with    Back Pain     Pt c/o lower back pain that started yesterday; denies injury     49-year-old history of some chronic back pain had disc surgery back in 23 patient feels like it has been working a little too much lately does not recall any definite trauma just feels like it is back is sore and having more spasms and normal no loss of bowel bladder function no weakness in the extremities just spasms when he bends and twists and moves vital signs reviewed blood pressure little elevated physical exam has palpable spasms in the lumbar muscles no lumbar spine tenderness normal neurological exam with the lower extremities patient given IV IV meds some Decadron anti-inflammatories pain meds will be sent home on p.o. meds follow up with the PCP patient understands and agrees with plan        Review of patient's allergies indicates:  No Known Allergies  Past Medical History:   Diagnosis Date    Arthritis     Back pain     chronic    Hypertension      Past Surgical History:   Procedure Laterality Date    MINIMALLY INVASIVE SURGICAL REMOVAL OF INTERVERTEBRAL DISC OF SPINE USING MICROSCOPE Right 2/7/2023    Procedure: DISCECTOMY, SPINE, MINIMALLY INVASIVE, USING MICROSCOPE, Right L5-S1;  Surgeon: Aleida Coates MD;  Location: [facility];  Service: Neurosurgery;  Laterality: Right;     No family history on file.  Social History     Tobacco Use    Smoking status: Never    Smokeless tobacco: Never   Substance Use Topics    Alcohol use: Yes     Alcohol/week: 6.0 standard drinks of alcohol     Types: 6 Drinks containing 0.5 oz of alcohol per week     Comment: occas    Drug use: Never     Review of Systems   Constitutional:  Negative for fever.   HENT:  Negative for sore throat.    Respiratory:  Negative for shortness of breath.    Cardiovascular:  Negative for chest pain.   Gastrointestinal:  Negative for nausea.   Genitourinary:  Negative for  dysuria.   Musculoskeletal:  Positive for back pain.   Skin:  Negative for rash.   Neurological:  Negative for weakness.   Hematological:  Does not bruise/bleed easily.   All other systems reviewed and are negative.      Physical Exam     Initial Vitals [05/15/24 0852]   BP Pulse Resp Temp SpO2   (!) 180/93 62 18 97.7 °F (36.5 °C) 99 %      MAP       --         Physical Exam    Nursing note and vitals reviewed.  Constitutional: He appears well-developed and well-nourished. He is active.   HENT:   Head: Normocephalic and atraumatic.   Eyes: Conjunctivae, EOM and lids are normal. Pupils are equal, round, and reactive to light.   Neck: Trachea normal and phonation normal. Neck supple. No thyroid mass present.   Normal range of motion.  Cardiovascular:  Normal rate, regular rhythm, normal heart sounds and normal pulses.           Pulmonary/Chest: Breath sounds normal.   Abdominal: Abdomen is soft. Bowel sounds are normal.   Musculoskeletal:         General: Normal range of motion.      Cervical back: Normal range of motion and neck supple.     Neurological: He is alert and oriented to person, place, and time. He has normal strength and normal reflexes.   Skin: Skin is warm and intact.   Psychiatric: He has a normal mood and affect. His speech is normal and behavior is normal. Judgment and thought content normal. Cognition and memory are normal.         ED Course   Procedures  Labs Reviewed - No data to display       Imaging Results    None          Medications   dexAMETHasone injection 8 mg (8 mg Intravenous Given 5/15/24 0987)   fentaNYL injection 50 mcg (50 mcg Intravenous Given 5/15/24 0950)   ketorolac injection 30 mg (30 mg Intravenous Given 5/15/24 0955)     Medical Decision Making  49-year-old history of some chronic back pain had disc surgery back in 23 patient feels like it has been working a little too much lately does not recall any definite trauma just feels like it is back is sore and having more spasms and  normal no loss of bowel bladder function no weakness in the extremities just spasms when he bends and twists and moves vital signs reviewed blood pressure little elevated physical exam has palpable spasms in the lumbar muscles no lumbar spine tenderness normal neurological exam with the lower extremities patient given IV IV meds some Decadron anti-inflammatories pain meds will be sent home on p.o. meds follow up with the PCP patient understands and agrees with plan          Risk  Prescription drug management.  Risk Details: Differential diagnosis lumbar strain herniated disc muscle spasms                                      Clinical Impression:  Final diagnoses:  [S39.012A] Lumbar strain, initial encounter (Primary)          ED Disposition Condition    Discharge Stable          ED Prescriptions       Medication Sig Dispense Start Date End Date Auth. Provider    methocarbamoL (ROBAXIN) 500 MG Tab Take 2 tablets (1,000 mg total) by mouth 3 (three) times daily. for 5 days 30 tablet 5/15/2024 5/20/2024 Kevin Curiel MD    predniSONE (DELTASONE) 20 MG tablet Take 1 tablet (20 mg total) by mouth once daily. for 5 days 5 tablet 5/15/2024 5/20/2024 Kevin Curiel MD          Follow-up Information       Follow up With Specialties Details Why Contact Info    [facility] - Emergency Dept Emergency Medicine  As needed [address] [facility] [id number] [phone]             Kevin Curiel MD  05/15/24 2528

## 2024-07-12 ENCOUNTER — HOSPITAL ENCOUNTER (EMERGENCY)
Facility: HOSPITAL | Age: 49
Discharge: HOME | End: 2024-07-12
Attending: EMERGENCY MEDICINE

## 2024-07-12 VITALS
DIASTOLIC BLOOD PRESSURE: 86 MMHG | WEIGHT: 180 LBS | SYSTOLIC BLOOD PRESSURE: 162 MMHG | HEIGHT: 74 IN | TEMPERATURE: 98 F | HEART RATE: 50 BPM | OXYGEN SATURATION: 100 % | RESPIRATION RATE: 18 BRPM | BODY MASS INDEX: 23.1 KG/M2

## 2024-07-12 DIAGNOSIS — M67.40 GANGLION CYST: Primary | ICD-10-CM

## 2024-07-12 PROCEDURE — 99281 EMR DPT VST MAYX REQ PHY/QHP: CPT

## 2024-07-12 NOTE — ED PROVIDER NOTES
NAME:  Helder Fish  [facility]:     [id number]  MRN:    [id number]  ADMIT DATE: 7/12/2024        eMERGENCY dEPARTMENT eNCOUnter    CHIEF COMPLAINT    Chief Complaint   Patient presents with    Mass     Pt with complaints of mass to top of right hand. He states it started with pain and he pressed on it, now with lump.  He also has an area on left wrist that has been there for a year but recently has gotten bigger.         HPI      Helder Fish is a 49 y.o. male who presents to the ED for evaluation of swelling to the top of the right hand and the palmar aspect of the left wrist.  Patient reports that the symptoms have gotten worse recently.  He states that he seems to have popped the mass on the right hand yesterday.  He reports it is impacting the function of his hand somewhat due to discomfort.          ALLERGIES    Review of patient's allergies indicates:  No Known Allergies    PAST MEDICAL HISTORY  Past Medical History:   Diagnosis Date    Arthritis     Back pain     chronic    Hypertension        SURGICAL HISTORY    Past Surgical History:   Procedure Laterality Date    MINIMALLY INVASIVE SURGICAL REMOVAL OF INTERVERTEBRAL DISC OF SPINE USING MICROSCOPE Right 2/7/2023    Procedure: DISCECTOMY, SPINE, MINIMALLY INVASIVE, USING MICROSCOPE, Right L5-S1;  Surgeon: Aleida Coates MD;  Location: [facility] OR;  Service: Neurosurgery;  Laterality: Right;       SOCIAL HISTORY    Social History     Socioeconomic History    Marital status: Single   Tobacco Use    Smoking status: Never    Smokeless tobacco: Never   Substance and Sexual Activity    Alcohol use: Yes     Alcohol/week: 6.0 standard drinks of alcohol     Types: 6 Drinks containing 0.5 oz of alcohol per week     Comment: occas    Drug use: Never       FAMILY HISTORY    No family history on file.    REVIEW OF SYSTEMS   ROS  All Systems otherwise negative except as noted in the History of Present Illness.        PHYSICAL EXAM    Reviewed Triage Note  VITAL SIGNS:   ED  "Triage Vitals [07/12/24 1008]   Enc Vitals Group      BP (!) 162/86      Pulse (!) 50      Resp 18      Temp 97.5 °F (36.4 °C)      Temp src       SpO2 100 %      Weight 180 lb      Height 6' 2"      Head Circumference       Peak Flow       Pain Score       Pain Loc       Pain Education       Exclude from Growth Chart        Patient Vitals for the past 24 hrs:   BP Temp Pulse Resp SpO2 Height Weight   07/12/24 1008 (!) 162/86 97.5 °F (36.4 °C) (!) 50 18 100 % 6' 2" (1.88 m) 81.6 kg (180 lb)           Physical Exam    Constitutional:  Well-developed, well-nourished. No acute distress  HENT:  Normocephalic, atraumatic.  Eyes:  EOMI. Conjunctiva normal without discharge.   Neck: Normal range of motion.No stridor. No meningismus.   Respiratory:  No respiratory distress, retractions, or conversational dyspnea. Cardiovascular:  Normal heart rate. No pitting lower extremity edema.   Musculoskeletal:  Ganglion cyst noted to the dorsum of the right hand in the palmar aspect of the left wrist  Integument:  Warm and dry. No rash.  Neurologic:  Normal motor function. No focal deficits noted. Alert and Interactive.  Psychiatric:  Affect normal. Mood normal.         LABS  Pertinent labs reviewed. (See chart for details)   Labs Reviewed - No data to display      RADIOLOGY    Imaging Results    None         PROCEDURES    Procedures      EKG     Interpreted by ERP:         ED COURSE & MEDICAL DECISION MAKING    Pertinent & Imaging studies reviewed. (See chart for details and specific orders.)        Medications - No data to display       Patient was reassured and will be referred to hand surgery for further care       DISPOSITION  Patient discharged in stable condition at No discharge date for patient encounter.      DISCHARGE INSTRUCTIONS & MEDS       Medication List        ASK your doctor about these medications      amLODIPine 10 MG tablet  Commonly known as: NORVASC     busPIRone 10 MG tablet  Commonly known as: BUSPAR   "   gabapentin 800 MG tablet  Commonly known as: NEURONTIN     indomethacin 50 MG capsule  Commonly known as: INDOCIN     losartan 25 MG tablet  Commonly known as: COZAAR                New Prescriptions    No medications on file           FINAL IMPRESSION    1. Ganglion cyst              Blood Pressure Follow-Up Advised  Patient advised to follow up with PCP within 3-5 days for blood pressure re-check if blood pressure is equal to or greater than 120/80.         Critical care time spent with this patient (not including separately billable items) was  0 minutes.     DISCLAIMER: This note was prepared with Dragon NaturallySpeaking voice recognition transcription software. Garbled syntax, mangled pronouns, and other bizarre constructions may be attributed to that software system.      Gerald Oakley MD  07/12/2024  10:19 AM           Gerald Oakley MD  07/12/24 1024

## 2024-09-04 ENCOUNTER — HOSPITAL ENCOUNTER (EMERGENCY)
Facility: HOSPITAL | Age: 49
Discharge: HOME | End: 2024-09-04
Attending: EMERGENCY MEDICINE

## 2024-09-04 VITALS
TEMPERATURE: 98 F | HEART RATE: 75 BPM | HEIGHT: 74 IN | SYSTOLIC BLOOD PRESSURE: 167 MMHG | WEIGHT: 185 LBS | OXYGEN SATURATION: 99 % | DIASTOLIC BLOOD PRESSURE: 80 MMHG | RESPIRATION RATE: 18 BRPM | BODY MASS INDEX: 23.74 KG/M2

## 2024-09-04 DIAGNOSIS — M54.50 ACUTE RIGHT-SIDED LOW BACK PAIN WITHOUT SCIATICA: Primary | ICD-10-CM

## 2024-09-04 PROCEDURE — 99284 EMERGENCY DEPT VISIT MOD MDM: CPT | Mod: 25

## 2024-09-04 RX ORDER — METHOCARBAMOL 750 MG/1
1500 TABLET, FILM COATED ORAL 3 TIMES DAILY
Qty: 30 TABLET | Refills: 0 | Status: SHIPPED | OUTPATIENT
Start: 2024-09-04 | End: 2024-09-09

## 2024-09-04 RX ORDER — PREDNISONE 10 MG/1
10 TABLET ORAL DAILY
Qty: 21 TABLET | Refills: 0 | Status: SHIPPED | OUTPATIENT
Start: 2024-09-04

## 2024-09-04 NOTE — ED PROVIDER NOTES
Encounter Date: 9/4/2024       History     Chief Complaint   Patient presents with    Back Pain     Lower back pain times two days states caught a pain in lower back and fell denies loc      This 49-year-old presents with complaints of low back pain for the past 2 days.  He states he had a prior back surgery in [place] roughly a year and a half ago.  He states that he caught a very bad pain and his right leg went weak and he fell yesterday.       Review of patient's allergies indicates:  No Known Allergies  Past Medical History:   Diagnosis Date    Arthritis     Back pain     chronic    Hypertension      Past Surgical History:   Procedure Laterality Date    MINIMALLY INVASIVE SURGICAL REMOVAL OF INTERVERTEBRAL DISC OF SPINE USING MICROSCOPE Right 2/7/2023    Procedure: DISCECTOMY, SPINE, MINIMALLY INVASIVE, USING MICROSCOPE, Right L5-S1;  Surgeon: Aleida Coates MD;  Location: [facility];  Service: Neurosurgery;  Laterality: Right;     No family history on file.  Social History     Tobacco Use    Smoking status: Never    Smokeless tobacco: Never   Substance Use Topics    Alcohol use: Yes     Alcohol/week: 6.0 standard drinks of alcohol     Types: 6 Drinks containing 0.5 oz of alcohol per week     Comment: occas    Drug use: Never     Review of Systems   Constitutional:  Negative for fever.   HENT:  Negative for sore throat.    Respiratory:  Negative for shortness of breath.    Cardiovascular:  Negative for chest pain.   Gastrointestinal:  Negative for nausea.   Genitourinary:  Negative for dysuria.   Musculoskeletal:  Positive for back pain.   Skin:  Negative for rash.   Neurological:  Negative for weakness.   Hematological:  Does not bruise/bleed easily.       Physical Exam     Initial Vitals [09/04/24 1003]   BP Pulse Resp Temp SpO2   (!) 167/80 75 18 97.8 °F (36.6 °C) 99 %      MAP       --         Physical Exam    Nursing note and vitals reviewed.  Constitutional: He appears well-developed and  well-nourished.   HENT:   Head: Normocephalic and atraumatic.   Mouth/Throat: Mucous membranes are normal.   Eyes: EOM are normal. Pupils are equal, round, and reactive to light.   Neck: Neck supple.   Normal range of motion.  Cardiovascular:  Normal rate, regular rhythm, normal heart sounds and intact distal pulses.           Pulmonary/Chest: Breath sounds normal.   Abdominal: Abdomen is soft. Bowel sounds are normal.   Musculoskeletal:         General: Normal range of motion.      Cervical back: Normal range of motion and neck supple.     Neurological: He is alert and oriented to person, place, and time. He has normal strength.   Skin: Skin is warm and dry. Capillary refill takes less than 2 seconds.   Psychiatric: He has a normal mood and affect. His behavior is normal. Judgment and thought content normal.         ED Course   Procedures  Labs Reviewed - No data to display       Imaging Results              X-Ray Lumbar Spine Ap And Lateral (Final result)  Result time 09/04/24 11:53:27      Final result by Jimy Wagner MD (09/04/24 11:53:27)                   Narrative:    EXAMINATION  XR LUMBAR SPINE AP AND LATERAL    CLINICAL HISTORY  pain;    TECHNIQUE  A total of 2 images submitted of the lumbosacral spine.    COMPARISON  14 February 2024    FINDINGS  Vertebral segments: No cortical displacement, acute compression deformity, or focal lesion.  No evidence of traumatic subluxation. Similar multilevel degenerative changes, again most pronounced at the lumbosacral junction.    Curvature: Normal lordosis is maintained.    Disc spaces: Moderate to severe L5-S1 intervertebral narrowing again noted, similar in comparison.  Remaining disc spaces preserved.    Other findings: The partially visualized pelvic joint spaces are congruent and no focal irregularity of the bony pelvis is identified.  No acute or focal soft tissue abnormality.    IMPRESSION  Similar degenerative changes without convincing acute  abnormality.      Electronically signed by: Jimy Wagner  Date:    09/04/2024  Time:    11:53                                     Medications - No data to display  Medical Decision Making  Amount and/or Complexity of Data Reviewed  Radiology: ordered.                                      Clinical Impression:  Final diagnoses:  [M54.50] Acute right-sided low back pain without sciatica (Primary)          ED Disposition Condition    Discharge Stable          ED Prescriptions       Medication Sig Dispense Start Date End Date Auth. Provider    predniSONE (DELTASONE) 10 MG tablet Take 1 tablet (10 mg total) by mouth once daily. Take 4 tabs x 3 days, then take 2 tabs x 3 days, then take 1 tab x 3 days. 21 tablet 9/4/2024 -- Juan Barron MD    methocarbamoL (ROBAXIN) 750 MG Tab Take 2 tablets (1,500 mg total) by mouth 3 (three) times daily. for 5 days 30 tablet 9/4/2024 9/9/2024 Juan Barron MD          Follow-up Information       Follow up With Specialties Details Why Contact Info    Aleida Coates MD Neurosurgery Schedule an appointment as soon as possible for a visit  As needed, If symptoms worsen [address] [facility] [address]  [phone]      Kym Yoo, NP Internal Medicine Schedule an appointment as soon as possible for a visit   [facility] [facility] [address]  [phone]               Juan Barron MD  09/04/24 1211

## 2024-09-04 NOTE — Clinical Note
"Helder Fish (Brian) was seen and treated in our emergency department on 9/4/2024.  He may return to work on 09/09/2024.       If you have any questions or concerns, please don't hesitate to call.       RN    "

## 2025-01-11 ENCOUNTER — HOSPITAL ENCOUNTER (EMERGENCY)
Facility: HOSPITAL | Age: 50
Discharge: HOME | End: 2025-01-11
Attending: FAMILY MEDICINE

## 2025-01-11 VITALS
WEIGHT: 190 LBS | BODY MASS INDEX: 24.38 KG/M2 | DIASTOLIC BLOOD PRESSURE: 73 MMHG | RESPIRATION RATE: 18 BRPM | HEART RATE: 73 BPM | SYSTOLIC BLOOD PRESSURE: 166 MMHG | OXYGEN SATURATION: 98 % | HEIGHT: 74 IN | TEMPERATURE: 99 F

## 2025-01-11 DIAGNOSIS — J06.9 VIRAL URI WITH COUGH: Primary | ICD-10-CM

## 2025-01-11 LAB
FLUAV AG UPPER RESP QL IA.RAPID: NOT DETECTED
FLUBV AG UPPER RESP QL IA.RAPID: NOT DETECTED
RSV A 5' UTR RNA NPH QL NAA+PROBE: NOT DETECTED
SARS-COV-2 RNA RESP QL NAA+PROBE: NOT DETECTED

## 2025-01-11 PROCEDURE — 0241U COVID/RSV/FLU A&B PCR: CPT | Performed by: FAMILY MEDICINE

## 2025-01-11 PROCEDURE — 99283 EMERGENCY DEPT VISIT LOW MDM: CPT

## 2025-01-11 RX ORDER — BENZONATATE 100 MG/1
100 CAPSULE ORAL 3 TIMES DAILY PRN
Qty: 20 CAPSULE | Refills: 0 | Status: SHIPPED | OUTPATIENT
Start: 2025-01-11 | End: 2025-01-21

## 2025-01-11 NOTE — ED PROVIDER NOTES
Encounter Date: 1/11/2025       History     Chief Complaint   Patient presents with    Cough     Cough, congestion, HA and body aches, pt denies fever or sore throat.      Pt is 49 y.o. male with PMH HTN, OA who presents for cough, congestion, HA and body aches that started a couple of days ago. Pt has sick contact with RSV in Ed with him today and with same symptoms. Pt denies f/n/v/d/c, rhinorrhea, SOB, CP, nasal congestion, ear/sinus pain.            Review of patient's allergies indicates:  No Known Allergies  Past Medical History:   Diagnosis Date    Arthritis     Back pain     chronic    Hypertension      Past Surgical History:   Procedure Laterality Date    MINIMALLY INVASIVE SURGICAL REMOVAL OF INTERVERTEBRAL DISC OF SPINE USING MICROSCOPE Right 2/7/2023    Procedure: DISCECTOMY, SPINE, MINIMALLY INVASIVE, USING MICROSCOPE, Right L5-S1;  Surgeon: Aleida Coates MD;  Location: [facility];  Service: Neurosurgery;  Laterality: Right;     No family history on file.  Social History     Tobacco Use    Smoking status: Never    Smokeless tobacco: Never   Substance Use Topics    Alcohol use: Yes     Alcohol/week: 6.0 standard drinks of alcohol     Types: 6 Drinks containing 0.5 oz of alcohol per week     Comment: occas    Drug use: Never     Review of Systems   All other systems reviewed and are negative.      Physical Exam     Initial Vitals [01/11/25 1500]   BP Pulse Resp Temp SpO2   (!) 166/73 73 18 98.9 °F (37.2 °C) 98 %      MAP       --         Physical Exam    Nursing note and vitals reviewed.  Constitutional: He appears well-developed and well-nourished.   HENT:   Head: Normocephalic and atraumatic.   Eyes: EOM are normal.   Neck:   Normal range of motion.  Cardiovascular:  Normal rate, regular rhythm and normal heart sounds.           Pulmonary/Chest: Breath sounds normal.   Musculoskeletal:         General: Normal range of motion.      Cervical back: Normal range of motion.     Neurological: He is  alert.   Skin: Skin is warm and dry.   Psychiatric: He has a normal mood and affect. Thought content normal.         ED Course   Procedures  Labs Reviewed   COVID/RSV/FLU A&B PCR - Normal       Result Value    Influenza A PCR Not Detected      Influenza B PCR Not Detected      Respiratory Syncytial Virus PCR Not Detected      SARS-CoV-2 PCR Not Detected      Narrative:     The Xpert Xpress SARS-CoV-2/FLU/RSV plus is a rapid, multiplexed real-time PCR test intended for the simultaneous qualitative detection and differentiation of SARS-CoV-2, Influenza A, Influenza B, and respiratory syncytial virus (RSV) viral RNA in either nasopharyngeal swab or nasal swab specimens.                Imaging Results    None          Medications - No data to display  Medical Decision Making  Overall 50 yo M with cough, HA. On PE, no abnormalities, lungs CTA BL.  Differential Diagnosis:   COVId, flu, RSV, viral URI  ED Management:  VSSAF  On PE, no abnormalities, lungs CTA BL.  Pt appears to be in no distress   COVID, flu, RSV ordered and neg. Treat symptomatically.  Return precautions discussed and follow up with PCP is recommended      Risk  Prescription drug management.                                      Clinical Impression:  Final diagnoses:  [J06.9] Viral URI with cough (Primary)                 Nohemi Samuels MD  01/11/25 1619

## 2025-01-11 NOTE — DISCHARGE INSTRUCTIONS
Please follow up with your PCP in 1 week. Take tylenol/motrin for fever/chills and headache. Take 1 capsule tessalon perle up to 3 times per day for cough. If you develop or have worsening fever, shortness of breath, chest pain, please return to the ED.

## 2025-02-04 ENCOUNTER — HOSPITAL ENCOUNTER (EMERGENCY)
Facility: HOSPITAL | Age: 50
Discharge: HOME | End: 2025-02-04
Attending: STUDENT IN AN ORGANIZED HEALTH CARE EDUCATION/TRAINING PROGRAM

## 2025-02-04 VITALS
BODY MASS INDEX: 23.75 KG/M2 | RESPIRATION RATE: 20 BRPM | TEMPERATURE: 98 F | HEART RATE: 73 BPM | WEIGHT: 185 LBS | OXYGEN SATURATION: 2 %

## 2025-02-04 DIAGNOSIS — J40 BRONCHITIS: ICD-10-CM

## 2025-02-04 DIAGNOSIS — J06.9 VIRAL URI WITH COUGH: Primary | ICD-10-CM

## 2025-02-04 PROCEDURE — 0241U COVID/RSV/FLU A&B PCR: CPT | Performed by: STUDENT IN AN ORGANIZED HEALTH CARE EDUCATION/TRAINING PROGRAM

## 2025-02-04 PROCEDURE — 99282 EMERGENCY DEPT VISIT SF MDM: CPT

## 2025-02-04 RX ORDER — CODEINE PHOSPHATE AND GUAIFENESIN 10; 100 MG/5ML; MG/5ML
5 SOLUTION ORAL EVERY 6 HOURS PRN
Qty: 60 ML | Refills: 0 | Status: SHIPPED | OUTPATIENT
Start: 2025-02-04 | End: 2025-02-07

## 2025-02-04 NOTE — ED PROVIDER NOTES
Encounter Date: 2/4/2025      History     Chief Complaint   Patient presents with    Cough    Generalized Body Aches    Headache     Since jan 11th has been having symptoms since with loss of voice at times     Helder Fish is a 49 y.o. male who presented to [facility] ED on 2/4/2025 with a primary complaint of cough has been going on since January 11th.  Patient at that time also was diagnosed with RSV just like his wife.  States he is now having generalized body aches as well.  Denies fever.    Past Medical History:  He  has a past medical history of Arthritis, Back pain, and Hypertension.    Past Surgical History:  He  has a past surgical history that includes Minimally invasive surgical removal of intervertebral disc of spine using microscope (Right, 2/7/2023).    Allergies:  Review of patient's allergies indicates:  No Known Allergies    Family History:  His family history is not on file.    Social History:  He  reports that he has never smoked. He has never used smokeless tobacco. He reports current alcohol use of about 6.0 standard drinks of alcohol per week. He reports that he does not use drugs.    Home Medications:  He has a current medication list which includes the following prescription(s): amlodipine, buspirone, gabapentin, guaifenesin-codeine 100-10 mg/5 ml, indomethacin, losartan, and prednisone, and the following Facility-Administered Medications: 0.9% nacl, albuterol-ipratropium, ceFAZolin 2 g in dextrose 5 % in water (D5W) 5 % 50 mL IVPB (MB+), hydromorphone, mupirocin, and naloxone.     ROS  Pertinent positives and negatives listed in HPI. All other systems are reviewed and are negative.    Physical Exam     Initial Vitals [02/04/25 0837]   BP Pulse Resp Temp SpO2   -- 73 20 98.2 °F (36.8 °C) (!) 2 %      MAP       --         General:  No acute distress  HEENT: Normocephalic, atraumatic. Face symmetric.  Cardiovascular: Regular rate & rhythm  Pulmonary: Bilateral symmetric chest rise, Non-labored.   Clear to auscultation bilaterally  Abdominal:  nondistended  Extremities: No clubbing or cyanosis.  Skin:  Exposed skin is warm & dry.  Neuro:   Patient moves all extremities equally. Sensation intact bilateraly.    ED Course   Procedures  Labs Reviewed   COVID/RSV/FLU A&B PCR - Normal       Result Value    Influenza A PCR Not Detected      Influenza B PCR Not Detected      Respiratory Syncytial Virus PCR Not Detected      SARS-CoV-2 PCR Not Detected      Narrative:     The Xpert Xpress SARS-CoV-2/FLU/RSV plus is a rapid, multiplexed real-time PCR test intended for the simultaneous qualitative detection and differentiation of SARS-CoV-2, Influenza A, Influenza B, and respiratory syncytial virus (RSV) viral RNA in either nasopharyngeal swab or nasal swab specimens.                Imaging Results    None          Medications - No data to display  Medical Decision Making  Risk  OTC drugs.      Helder Fish is a 49 y.o. male who presented to [facility] ED on 2/4/2025 with a primary complaint of cough has been going on since January 11th.  Patient at that time also was diagnosed with RSV just like his wife.  States he is now having generalized body aches as well.  Denies fever.    Physical exam as above.    COVID and flu negative.  Explained to patient that acute bronchitis is something that can take a long time to get over.  Is in understanding of this.  Patient was discharged.    Amount and/or Complexity of Data Reviewed  External Data Reviewed:  Notes, labs  Labs:  All labs that have been ordered have been reviewed and are documented in ED Course.  Radiology: All images that have been ordered have been reviewed and are documented in ED Course.         Ddx: COVID-19, influenza, otitis media, URI, Asthma, COPD, Pericardial Effusion, Pulmonary Emboli, Pleural effusion, malignancy, pneumonia,  among others.      The patient is resting comfortably and is in no acute distress.  Patient states that his symptoms have improved after  treatment within ER. Discussed test results, shared treatment plan, specific conditions for return, and importance of follow up with patient and family. Advised to complete his treatment with positive as well. The patient understands and agrees with the plan as discussed. Answered all patient questions at this time.He has remained hemodynamically stable throughout the ED course and is appropriate for discharge home.                           Clinical Impression:  Final diagnoses:  [J06.9] Viral URI with cough (Primary)  [J40] Bronchitis            ED Disposition Condition    Discharge Stable          ED Prescriptions       Medication Sig Dispense Start Date End Date Auth. Provider    guaiFENesin-codeine 100-10 mg/5 ml (TUSSI-ORGANIDIN NR)  mg/5 mL syrup Take 5 mLs by mouth every 6 (six) hours as needed for Cough. 60 mL 2/4/2025 2/7/2025 Jimy Cooney MD          Follow-up Information       Follow up With Specialties Details Why Contact Info    [facility] - Emergency Dept Emergency Medicine  If symptoms worsen [address] [id number] [phone]    PCP  Schedule an appointment as soon as possible for a visit in 3 days               Jimy Cooney MD  02/04/25 2869

## 2025-02-04 NOTE — Clinical Note
"Helder Phillipssarah Fish was seen and treated in our emergency department on 2/4/2025.  He may return to work on 02/05/2025.       If you have any questions or concerns, please don't hesitate to call.      Jimy Cooney MD"

## 2025-03-13 ENCOUNTER — HOSPITAL ENCOUNTER (EMERGENCY)
Facility: HOSPITAL | Age: 50
Discharge: HOME | End: 2025-03-13

## 2025-03-13 VITALS
BODY MASS INDEX: 24.38 KG/M2 | RESPIRATION RATE: 18 BRPM | OXYGEN SATURATION: 100 % | TEMPERATURE: 98 F | SYSTOLIC BLOOD PRESSURE: 174 MMHG | HEART RATE: 82 BPM | DIASTOLIC BLOOD PRESSURE: 84 MMHG | WEIGHT: 190 LBS | HEIGHT: 74 IN

## 2025-03-13 DIAGNOSIS — R11.0 NAUSEA: ICD-10-CM

## 2025-03-13 DIAGNOSIS — R05.9 COUGH, UNSPECIFIED TYPE: ICD-10-CM

## 2025-03-13 DIAGNOSIS — R09.81 NASAL CONGESTION: Primary | ICD-10-CM

## 2025-03-13 PROCEDURE — 25000003 PHARM REV CODE 250

## 2025-03-13 PROCEDURE — 0241U COVID/RSV/FLU A&B PCR: CPT

## 2025-03-13 PROCEDURE — 99284 EMERGENCY DEPT VISIT MOD MDM: CPT

## 2025-03-13 RX ORDER — ONDANSETRON 4 MG/1
4 TABLET, ORALLY DISINTEGRATING ORAL
Status: COMPLETED | OUTPATIENT
Start: 2025-03-13 | End: 2025-03-13

## 2025-03-13 RX ORDER — AZITHROMYCIN 250 MG/1
TABLET, FILM COATED ORAL
Qty: 6 TABLET | Refills: 0 | Status: SHIPPED | OUTPATIENT
Start: 2025-03-13 | End: 2025-03-18

## 2025-03-13 RX ORDER — ONDANSETRON 4 MG/1
4 TABLET, FILM COATED ORAL EVERY 8 HOURS PRN
Qty: 12 TABLET | Refills: 0 | Status: SHIPPED | OUTPATIENT
Start: 2025-03-13

## 2025-03-13 RX ADMIN — ONDANSETRON 4 MG: 4 TABLET, ORALLY DISINTEGRATING ORAL at 08:03

## 2025-03-13 NOTE — ED PROVIDER NOTES
Encounter Date: 3/13/2025       History     Chief Complaint   Patient presents with    Cough    Headache    Fever     Pt c/o cough, congestion, body aches, fever off and on x 3 weeks, girlfriend recently had the flu.      50-year-old male with past medical history of hypertension presents to the ED due to nasal congestion that has been going on for 3 weeks.  Patient is also complaining of generalized body aches and a cough.  States that his girlfriend was diagnosed with the flu a few weeks ago.  Patient states he was seen in the ED a few weeks ago and was discharged home with cough suppressant medication that did not help.  Patient is also complaining of subjective fevers but did not check his temperature.  Patient is now also complaining of nausea but denies abdominal pain, vomiting, diarrhea or constipation.  Denies headache, dizziness, chest pain, shortness of breath, difficulty urinating.  Denies any new sick contacts.        Review of patient's allergies indicates:  No Known Allergies  Past Medical History:   Diagnosis Date    Arthritis     Back pain     chronic    Hypertension      Past Surgical History:   Procedure Laterality Date    MINIMALLY INVASIVE SURGICAL REMOVAL OF INTERVERTEBRAL DISC OF SPINE USING MICROSCOPE Right 2/7/2023    Procedure: DISCECTOMY, SPINE, MINIMALLY INVASIVE, USING MICROSCOPE, Right L5-S1;  Surgeon: Aleida Coates MD;  Location: [facility] MAIN OR;  Service: Neurosurgery;  Laterality: Right;     No family history on file.  Social History[1]  Review of Systems   Constitutional:  Negative for chills and fever.   HENT:  Positive for congestion.    Respiratory:  Negative for shortness of breath.    Cardiovascular:  Negative for chest pain.   Gastrointestinal:  Positive for nausea. Negative for abdominal pain, diarrhea and vomiting.   Neurological:  Negative for dizziness and headaches.       Physical Exam     Initial Vitals [03/13/25 0750]   BP Pulse Resp Temp SpO2   (!) 174/84 82 18 97.7 °F  (36.5 °C) 100 %      MAP       --         Physical Exam    Nursing note and vitals reviewed.  Constitutional: He appears well-developed and well-nourished. He is not diaphoretic.   HENT:   Head: Normocephalic.   Nose: Nose normal. Mouth/Throat: No oropharyngeal exudate.   Nasal congestion   Eyes: Pupils are equal, round, and reactive to light.   Cardiovascular:  Normal rate, regular rhythm and normal heart sounds.           Pulmonary/Chest: Breath sounds normal.   Abdominal: Abdomen is soft.     Neurological: He is oriented to person, place, and time. He has normal strength. GCS score is 15. GCS eye subscore is 4. GCS verbal subscore is 5. GCS motor subscore is 6.         ED Course   Procedures  Labs Reviewed   COVID/RSV/FLU A&B PCR - Normal       Result Value    Influenza A PCR Not Detected      Influenza B PCR Not Detected      Respiratory Syncytial Virus PCR Not Detected      SARS-CoV-2 PCR Not Detected      Narrative:     The Xpert Xpress SARS-CoV-2/FLU/RSV plus is a rapid, multiplexed real-time PCR test intended for the simultaneous qualitative detection and differentiation of SARS-CoV-2, Influenza A, Influenza B, and respiratory syncytial virus (RSV) viral RNA in either nasopharyngeal swab or nasal swab specimens.                Imaging Results    None          Medications   ondansetron disintegrating tablet 4 mg (4 mg Oral Given 3/13/25 0817)     Medical Decision Making  See ED course for MDM.    Risk  Prescription drug management.               ED Course as of 03/13/25 0853   Thu Mar 13, 2025   0801 50-year-old male with past medical history of hypertension presents to the ED due to nasal congestion that has been going on for 3 weeks.  Patient is also complaining of generalized body aches and a cough.  States that his girlfriend was diagnosed with the flu a few weeks ago.  Patient states he was seen in the ED a few weeks ago and was discharged home with cough suppressant medication that did not help.   Patient is also complaining of subjective fevers but did not check his temperature.  Patient is now also complaining of nausea but denies abdominal pain, vomiting, diarrhea or constipation.  Denies headache, dizziness, chest pain, shortness of breath, difficulty urinating.  Denies any new sick contacts.    On examination patient is awake and alert.  Vital signs are stable.  Heart is regular rate and rhythm.  Lungs are clear.  Abdomen is soft nontender.  No swelling to lower extremities.  Patient does have nasal congestion.      Differential diagnosis consists of but not limited to COVID, flu, viral illness, nasal congestion, bronchitis.    We will give patient Zofran for his nausea and test him for COVID, flu, RSV. [NP]   0851 COVID, flu, RSV swabs are negative.  Patient is resting comfortably.  I suspect that patient's symptoms are most likely viral.    Patient educated to take decongestants from over-the-counter to help with his nasal congestion.  Patient is requesting a steroid and antibiotic shot state that this is not appropriate treatment for a viral illness.  However I will send a Z-Devi and Zofran to his pharmacy. [NP]      ED Course User Index  [NP] Genia Bower DO                           Clinical Impression:  Final diagnoses:  [R09.81] Nasal congestion (Primary)  [R05.9] Cough, unspecified type  [R11.0] Nausea          ED Disposition Condition    Discharge Stable          ED Prescriptions       Medication Sig Dispense Start Date End Date Auth. Provider    ondansetron (ZOFRAN) 4 MG tablet Take 1 tablet (4 mg total) by mouth every 8 (eight) hours as needed for Nausea. 12 tablet 3/13/2025 -- Genia Bower DO    azithromycin (Z-DEVI) 250 MG tablet Take 2 tablets by mouth on day 1; Take 1 tablet by mouth on days 2-5 6 tablet 3/13/2025 3/18/2025 Genia Bower DO          Follow-up Information       Follow up With Specialties Details Why Contact Info    [facility] - Emergency Dept  Emergency Medicine Schedule an appointment as soon as possible for a visit in 1 week  [address] [facility] [address] [phone]               [1]   Social History  Tobacco Use    Smoking status: Never    Smokeless tobacco: Never   Substance Use Topics    Alcohol use: Yes     Alcohol/week: 6.0 standard drinks of alcohol     Types: 6 Drinks containing 0.5 oz of alcohol per week     Comment: occas    Drug use: Never        Genia Bower DO  03/13/25 0853

## 2025-03-13 NOTE — DISCHARGE INSTRUCTIONS
Your RSV, COVID and flu swabs are negative.    I have sent a Z-Harinder and Zofran for nausea to your pharmacy.  Please take it up.  Follow up with your primary care doctor.

## 2025-03-13 NOTE — Clinical Note
"Helder Phillipsian" Polina was seen and treated in our emergency department on 3/13/2025.  He may return to work on 03/14/2025.       If you have any questions or concerns, please don't hesitate to call.      MD Cheli / Ana Maria GEORGE    "

## 2025-04-28 ENCOUNTER — HOSPITAL ENCOUNTER (EMERGENCY)
Facility: HOSPITAL | Age: 50
Discharge: HOME | End: 2025-04-28
Attending: FAMILY MEDICINE

## 2025-04-28 VITALS
HEIGHT: 73 IN | RESPIRATION RATE: 16 BRPM | BODY MASS INDEX: 25.18 KG/M2 | WEIGHT: 190 LBS | OXYGEN SATURATION: 99 % | HEART RATE: 55 BPM | SYSTOLIC BLOOD PRESSURE: 131 MMHG | TEMPERATURE: 98 F | DIASTOLIC BLOOD PRESSURE: 77 MMHG

## 2025-04-28 DIAGNOSIS — L03.114 CELLULITIS OF LEFT UPPER EXTREMITY: ICD-10-CM

## 2025-04-28 DIAGNOSIS — L73.9 FOLLICULITIS: Primary | ICD-10-CM

## 2025-04-28 PROCEDURE — 99283 EMERGENCY DEPT VISIT LOW MDM: CPT

## 2025-04-28 RX ORDER — AMOXICILLIN AND CLAVULANATE POTASSIUM 875; 125 MG/1; MG/1
1 TABLET, FILM COATED ORAL 2 TIMES DAILY
Qty: 14 TABLET | Refills: 0 | Status: SHIPPED | OUTPATIENT
Start: 2025-04-28

## 2025-04-28 NOTE — DISCHARGE INSTRUCTIONS
Recommend warm compresses 4 times a day take antibiotics as prescribed wiped area down with alcohol twice a day

## 2025-04-28 NOTE — ED PROVIDER NOTES
Encounter Date: 4/28/2025       History     Chief Complaint   Patient presents with    Skin Problem     Abscess to left arm. Unsure if bit by insect. X 3 days. Minimal drainage.     50-year-old presents has a small little area of infection left forearm looks like a little folliculitis turned into cellulitis no palpable abscess does have some tenderness like it low early cellulitis around the hair follicle with a pustule not allergic to any meds no previous history of MRSA your vital signs stable no fevers no chills no other lesions discussed findings and treatment plan with the patient no need for lab work at this time we will put on some antibiotics recommend warm compresses give it about 3 days return to ER if condition worsens patient understands and agrees with the plan        Review of patient's allergies indicates:  No Known Allergies  Past Medical History:   Diagnosis Date    Arthritis     Back pain     chronic    Hypertension      Past Surgical History:   Procedure Laterality Date    MINIMALLY INVASIVE SURGICAL REMOVAL OF INTERVERTEBRAL DISC OF SPINE USING MICROSCOPE Right 2/7/2023    Procedure: DISCECTOMY, SPINE, MINIMALLY INVASIVE, USING MICROSCOPE, Right L5-S1;  Surgeon: Aleida Coates MD;  Location: [facility];  Service: Neurosurgery;  Laterality: Right;     No family history on file.  Social History[1]  Review of Systems   Skin:  Positive for color change.   All other systems reviewed and are negative.      Physical Exam     Initial Vitals [04/28/25 0823]   BP Pulse Resp Temp SpO2   131/77 (!) 55 16 98.2 °F (36.8 °C) 99 %      MAP       --         Physical Exam    Nursing note and vitals reviewed.  Constitutional: He appears well-developed and well-nourished.   HENT:   Head: Normocephalic and atraumatic.   Eyes: Conjunctivae and EOM are normal. Pupils are equal, round, and reactive to light.   Neck:   Normal range of motion.  Cardiovascular:  Normal rate, regular rhythm and normal heart sounds.      Exam reveals no friction rub.       No murmur heard.  Pulmonary/Chest: Breath sounds normal. He has no wheezes. He has no rhonchi. He has no rales.   Abdominal: Abdomen is soft. Bowel sounds are normal. He exhibits no distension. There is no abdominal tenderness. There is no rebound and no guarding.   Musculoskeletal:         General: Normal range of motion.      Cervical back: Normal range of motion.     Neurological: He is alert and oriented to person, place, and time. He has normal strength and normal reflexes. GCS score is 15. GCS eye subscore is 4. GCS verbal subscore is 5. GCS motor subscore is 6.   Skin: Skin is warm and dry. There is erythema.   Pustule and cellulitis left forearm   Psychiatric: He has a normal mood and affect. His behavior is normal. Thought content normal.         ED Course   Procedures  Labs Reviewed - No data to display       Imaging Results    None          Medications - No data to display  Medical Decision Making  50-year-old presents has a small little area of infection left forearm looks like a little folliculitis turned into cellulitis no palpable abscess does have some tenderness like it low early cellulitis around the hair follicle with a pustule not allergic to any meds no previous history of MRSA your vital signs stable no fevers no chills no other lesions discussed findings and treatment plan with the patient no need for lab work at this time we will put on some antibiotics recommend warm compresses give it about 3 days return to ER if condition worsens patient understands and agrees with the plan          Risk  Prescription drug management.  Risk Details: Differential diagnosis cellulitis abscess folliculitis                                      Clinical Impression:  Final diagnoses:  [L73.9] Folliculitis (Primary)  [L03.114] Cellulitis of left upper extremity          ED Disposition Condition    Discharge Stable          ED Prescriptions       Medication Sig Dispense Start  Date End Date Auth. Provider    amoxicillin-clavulanate 875-125mg (AUGMENTIN) 875-125 mg per tablet Take 1 tablet by mouth 2 (two) times daily. 14 tablet 4/28/2025 -- Kevin Curiel MD          Follow-up Information       Follow up With Specialties Details Why Contact Info    [facility]  If symptoms worsen [address] [address] [phone]               [1]   Social History  Tobacco Use    Smoking status: Never    Smokeless tobacco: Never   Substance Use Topics    Alcohol use: Yes     Alcohol/week: 6.0 standard drinks of alcohol     Types: 6 Drinks containing 0.5 oz of alcohol per week     Comment: occas    Drug use: Never        Kevin Curiel MD  04/28/25 0862

## 2025-06-04 DIAGNOSIS — Z15.89 HLA B27 (HLA B27 POSITIVE): Primary | ICD-10-CM

## 2025-07-25 NOTE — PROGRESS NOTES
Patient ID: [id number]     Chief Complaint: new patient (Abnormal blood test HLA B27 was positive  Reports pain 5/10 back mid-lower,ankles,feet,wrist./)      Referred By: [facility] Dr. Syed/Kym Yoo NP    HPI:     Helder Fish is a 50 y.o. male here today for a new patient visit +HLA B27.      Presents today for evaluation of +HLA B27.  Last OV note from Dr. Syed: 03/26/2018: History of dactylitis, originally diagnosed with Dr. Syed around 2013.  Past meds Humira, discontinued due to frequent flares.  Seronegative spondyloarthropathy.  Diagnosis: Dactylitis.    He presents today- has not seen Rheumatology since 2018- has been followed by PCP- denies taking any other medication besides Humira- has been taking Gabapentin and occ Prednisone when needed, has also used Tylenol #4 in the past as well very sparingly. He recalls overall pain starting around 2013- pain continues to come and go- more issues with his wrists recently, will swell at times and very painful. He also continues to have pain in his lower back, pain in his ankles and feet, denies red/warmth to these areas but will have swelling. Will occur maybe 2 times a month he will suffer with flares and has a difficult time with movement. Overall pain does improve with movement, including back pain- towards the end of he day his back will be more bothersome. Back pain does wake him from sleep, does have to get up stretch/move around- has never attended PT in the past. Fell off of ladder and triggered lower back pain. Does have shooting pains/numbness/tingling to bilateral lower extremities. He does report having back pain prior to this but worsened after fall. Denies having JILL's in the past. He has been using Aleve for flares and helps some but not much.  He states he has not had any issues with dactylitis since he stopped Humira. He does report symptoms of enthesitis in the past, lizzette when ankles/feet are flaring.   He does feel feverish at times-  never checked his temp but alena' reports will notice roughly once a month.       Previously seen by Neurosurgery, last evaluation back in 2023, he is status post right L5-S1 MIS discectomy on 02/07/2023 with Dr. Coates- no longer following with.     Denies history of fevers, rashes, photosensitivity, oral or nasal ulcers, h/o MI, stroke, seizures, h/o PE or DVT, Raynaud's phenomenon, uveitis, malignancies.   Family history of autoimmune disease: Father with RA   Smoking: Life long nonsmoker   Occupation: [employer]  ([employer] wheel[employer])     -------------------------------------    Arthritis    Back pain    chronic    Hypertension        Past Surgical History:   Procedure Laterality Date    MINIMALLY INVASIVE SURGICAL REMOVAL OF INTERVERTEBRAL DISC OF SPINE USING MICROSCOPE Right 2/7/2023    Procedure: DISCECTOMY, SPINE, MINIMALLY INVASIVE, USING MICROSCOPE, Right L5-S1;  Surgeon: Aleida Coates MD;  Location: [facility];  Service: Neurosurgery;  Laterality: Right;       Review of patient's allergies indicates:  No Known Allergies    Current Outpatient Medications   Medication Instructions    amLODIPine (NORVASC) 10 mg, Daily    amoxicillin-clavulanate 875-125mg (AUGMENTIN) 875-125 mg per tablet 1 tablet, Oral, 2 times daily    busPIRone (BUSPAR) 10 mg, 3 times daily    gabapentin (NEURONTIN) 800 mg, 3 times daily    indomethacin (INDOCIN) 50 MG capsule 1 capsule with food or milk Orally Three times a day for 10 days    indomethacin (INDOCIN) 50 mg, 3 times daily    losartan (COZAAR) 25 mg, Daily    methocarbamoL (ROBAXIN) 750 MG Tab 1 tablet Orally every 8 hrs    ondansetron (ZOFRAN) 4 mg, Oral, Every 8 hours PRN    predniSONE (DELTASONE) 10 mg, Oral, Daily, Take 4 tabs x 3 days, then take 2 tabs x 3 days, then take 1 tab x 3 days.    sildenafiL (VIAGRA) 50 MG tablet 1 tablet as needed Orally Once a day       Social History[1]     Family History   Problem Relation Name Age of Onset    Bone  cancer Father      Heart disease Father            There is no immunization history on file for this patient.    Patient Care Team:  Kym Yoo, NP as PCP - General (Internal Medicine)     Subjective:     ROS    Constitutional:  Denies chills. Denies fever. Denies night sweats. Denies weight loss. Admits sleep disturbance due to back and joint pain.  Ophthalmology: Denies blurred vision. Admits diminished visual acuity. Denies dry eyes. Denies eye pain. Denies Itching and redness.   ENT: Denies decreased hearing. Denies oral ulcers. Denies epistaxis. Denies swelling of ears or throat. Denies dry mouth. Denies swollen glands. Denies hair loss.   Endocrine: Denies diabetes. Denies thyroid Problems.   Respiratory: Denies cough. Denies shortness of breath. Denies shortness of breath with exertion. Denies hemoptysis.   Cardiovascular: Denies chest pain at rest. Denies chest pain with exertion. Denies Irregular heartbeat. Denies palpitations. Denies edema. Denies orthopnea.   Gastrointestinal: Denies abdominal pain. Denies diarrhea. Denies nausea. Denies vomiting. Denies hematemesis or hematochezia. Denies change in appetite. Denies heartburn.  Genitourinary: Denies dysuria. Denies urinary frequency. Denies urinary urgency. Denies blood in urine.  Musculoskeletal: See HPI for details  Integumentary: Denies rash. Denies Itching. Denies dry skin. Denies photosensitivity.   Peripheral Vascular: Denies Ulcers of hands and/or feet. Denies Cold extremities.   Neurologic: Denies dizziness. Admits headache- comes and goes. Denies difficulty speaking. Admits loss of strength to BUE. Admits numbness or tingling to right hand 4th/5th digits and right foot 4th and 5th toes comes and goes.  Psychiatric: Denies depression. Denies anxiety. Denies suicidal/homicidal ideations.      Objective:     Visit Vitals  BP (!) 161/101 (BP Location: Left arm, Patient Position: Sitting)   Pulse 68   Temp 97.3 °F (36.3 °C) (Oral)   Resp 20   Ht  "6' 1" (1.854 m)   Wt 84.1 kg (185 lb 6.4 oz)   SpO2 99%   BMI 24.46 kg/m²       Physical Exam  General Appearance: alert, pleasant, in no acute distress.  Skin: Skin color, texture, turgor normal. No rashes or lesions.  Eyes:  extraocular movement intact (EOMI), pupils equal, round, reactive to light and accommodation, conjunctiva clear.  ENT: No oral or nasal ulcers.  Neck:  Neck supple. No adenopathy.   Lungs: CTA bilaterally without crackles, rhonchi, or wheezes.   Heart: RRR w/o murmurs.  No edema. 2+ DP pulse.  Abdomen: Soft, non-tender, no masses, rebound or guarding.  Neuro: Alert, oriented, CN II-XII GI, sensory and motor innervation intact.  Musculoskeletal: + active synovitis noted to bilateral 2nd/3rd MCPs and several PIPs, no pain in DIPs, slightly puffy hands bilaterally, FROM noted to bilateral wrists with synovial thickening noted, mild tenderness, elbows and shoulders with no pain, FROM noted to bilateral knees, no pain, no effusions noted, no pain to bilateral ankles or MTPs, - SLE/Anjel bilaterally, good ROM to both spine and hips.    Psych: Alert, oriented, normal eye contact.       Labs Reviewed:   03/26/2018 CMP okay, HLA B27 positive, CBC hemoglobin 13.1, otherwise okay, HIV, hep A/B/C/QuantiFERON all negative.    Rheumatology:  Lab Results   Component Value Date    SEDRATE 2 03/26/2018    LABURIC 7.3 (H) 07/14/2021        Chemistry:  Lab Results   Component Value Date     04/12/2023     02/06/2023    K 4.2 04/12/2023    K 4.0 02/06/2023    BUN 17.7 04/12/2023    BUN 25 (H) 02/06/2023    CREATININE 1.09 04/12/2023    CREATININE 1.2 02/06/2023    EGFRNORACEVR >60 04/12/2023    EGFRNORACEVR >60.0 02/06/2023    CALCIUM 10.1 04/12/2023    CALCIUM 9.4 02/06/2023    ALKPHOS 74 02/16/2023    LABPROT 10.1 02/06/2023    ALBUMIN 4.5 02/16/2023    BILIDIR 0.14 07/20/2019    IBILI 0.36 07/20/2019    AST 17 02/16/2023    ALT 25 02/16/2023        Hematology:  Lab Results   Component Value Date    " "WBC 5.7 2023    HGB 13.0 (L) 2023    HCT 41.7 (L) 2023     2023        Urine:  Lab Results   Component Value Date    APPEARANCEUA Clear 2023    SGUA 1.025 2023    PROTEINUA Negative 2023    KETONESUA Negative 2023    LEUKOCYTESUR Negative 2023    RBCUA None Seen 2023    WBCUA None Seen 2023    BACTERIA None Seen 2023        No results found for: "PROTEINURINE", "CREATRANDUR", "UPROTCREA"     Imagin2018 right hand x-ray: Impression:  No acute findings.  Left hand x-ray: Impression: No acute findings.  Pelvis/SI joint x-ray: Impression: Mild degenerative narrowing of the hip joints and subchondral as cerebellar mild sclerosis.  Otherwise articular surfaces are unremarkable, no acute fracture or dislocation, overlying soft tissue grossly are unremarkable without foreign body noted.    Chest x-ray: Impression:  No acute cardiopulmonary process.    2024 LSpine Xray: Disc spaces: Moderate to severe L5-S1 intervertebral narrowing again noted, similar in comparison.  Remaining disc spaces preserved. Other findings: The partially visualized pelvic joint spaces are congruent and no focal irregularity of the bony pelvis is identified.  No acute or focal soft tissue abnormality. IMPRESSION: Similar degenerative changes without convincing acute abnormality.    Assessment:       ICD-10-CM ICD-9-CM   1. HLA B27 (HLA B27 positive)  Z15.89 V84.89   2. High risk medication use  Z79.899 V58.69   3. Drug-induced immunodeficiency  D84.821 279.3    Z79.899 E947.9   4. Bilateral hand pain  M79.641 729.5    M79.642    5. Bilateral foot pain  M79.671 729.5    M79.672    6. Chronic pain of both shoulders  M25.511 719.41    G89.29 338.29    M25.512    7. Chronic midline low back pain with bilateral sciatica  M54.41 724.2    M54.42 724.3    G89.29 338.29        Plan:     1. HLA B27 (HLA B27 positive) (Primary)/ Spondyloarthropathy   Very pleasant " 50-year-old male who presents today with his fiancee Ms. Clark for evaluation of + HLA B27.  He was previously seen by Rheumatology, Dr. Syed in the past, last OV note received in March 2018 diagnosis with seronegative spondyloarthropathy/dactylitis.  He has tried and failed Humira in the past, has not been on any medication besides NSAIDs/occasional Prednisone and Gabapentin since this time.  Has not seen Rheumatology since this time as well.  He reports ongoing back pain that wakes him from sleep, stretching and movement does help, he has had a previous diskectomy back in 2023 after a fall off of a ladder, reports pain prior to this however worsened.  He is no longer followed by Neurosurgery or pain management.  He does report having pain and swelling to bilateral hands and feet, bilateral shoulder pain as well.  Overall joint pain does improve with movement.  He denies a history of rashes.  Today on exam, mild puffy hands bilaterally, + synovitis noted to 2nd/3rd MCPs and several PIPs, no dactylitis or enthesitis noted today.  - Labs and x-rays today for further evaluation  - Pending labs we will consider starting MTX, reviewed and discussed below- will send Rx once labs reviewed- will need follow up labs in 4 weeks after starting medication.     - Discussed risks and benefits of Methotrexate (MTX) in detail. MTX is an immunosuppressant medication.  When used in high doses (such as in cancer), it may have many side effects.  The doses that are used in rheumatological disorders are much lower.  Side effects were explained to the patient, including kidney and liver damage, bone marrow suppression, increased infection risk, mouth sores, hair loss, nausea, GI symptoms.   Start Methotrexate at 10 mg per week x 2 weeks then increase dose to 15 mg po qweek.  Start folic 1mg daily to prevent some of the side effects of methotrexate.  Labwork: CBC and a CMP should be checked at baseline, monthly for the first few months  and every 3 months afterwards.  Check a Hepatitis Panel and a Chest X-Ray prior to initiation of methotrexate.  Patient to call with any concerns and adverse effects of MTX.  - Methotrexate is also teratogenic, so birth control is needed while on this medication if applicable.  Counselled on limiting alcohol use to 1-2 drinks/week while on methotrexate given potential liver toxicity.    2. High risk medication use/ Drug-induced immunodeficiency   - Persons with rheumatoid arthritis, lupus, psoriatic arthritis and other autoimmune diseases are at increased risk of cardiovascular disease including heart attack and stroke. We recommend that all patients with these conditions have annual health maintenance exams including lipid measurements, blood pressure measurements, and smoking cessation counseling when applicable at their primary care provider's office.  - Advised to stay up-to-date on age appropriate vaccinations and malignancy screening with PCP.   - Continued lab monitoring.     3. Bilateral hand pain/foot pain/shoulder pain  X-rays today for further evaluation  Continue stretching as tolerated, consider PT eval in the future.    4. Chronic midline low back pain with bilateral sciatica  - Xrays today, consider PT Eval and treat in the future, continue with home stretching          Follow up in about 3 months (around 11/4/2025) for NP Follow Up. In addition to their scheduled follow up, the patient has also been instructed to follow up on as needed basis.       Total time spent with patient and documentation is 60 minutes. All questions were answered to patient's satisfaction and patient verbalized understanding. This includes face to face time and non-face to face time preparing to see the patient (eg, review of tests), obtaining and/or reviewing separately obtained history, documenting clinical information in the electronic or other health record, independently interpreting results and communicating results to  the patient/family/caregiver, or care coordinator.           Orders Placed This Encounter   Procedures    X-Ray Foot Complete Left     Standing Status:   Future     Expected Date:   8/4/2025     Expiration Date:   8/4/2026     May the Radiologist modify the order per protocol to meet the clinical needs of the patient?:   Yes     Release to patient:   Immediate    X-Ray Foot Complete Right     Standing Status:   Future     Expected Date:   8/4/2025     Expiration Date:   8/4/2026     May the Radiologist modify the order per protocol to meet the clinical needs of the patient?:   Yes     Release to patient:   Immediate    X-Ray Hand 3 view Left     Standing Status:   Future     Expected Date:   8/4/2025     Expiration Date:   8/4/2026     May the Radiologist modify the order per protocol to meet the clinical needs of the patient?:   Yes     Release to patient:   Immediate    X-Ray Hand 3 view Right     Standing Status:   Future     Expected Date:   8/4/2025     Expiration Date:   8/4/2026     May the Radiologist modify the order per protocol to meet the clinical needs of the patient?:   Yes     Release to patient:   Immediate    X-Ray Lumbar Complete Including Flex And Ext     Standing Status:   Future     Expected Date:   8/4/2025     Expiration Date:   8/4/2026     May the Radiologist modify the order per protocol to meet the clinical needs of the patient?:   Yes    X-Ray Sacroiliac Joints 3 Views     Standing Status:   Future     Expected Date:   8/4/2025     Expiration Date:   8/4/2026     May the Radiologist modify the order per protocol to meet the clinical needs of the patient?:   Yes     Release to patient:   Immediate    X-Ray Chest PA And Lateral     Standing Status:   Future     Expected Date:   8/4/2025     Expiration Date:   8/4/2026     May the Radiologist modify the order per protocol to meet the clinical needs of the patient?:   Yes     Release to patient:   Immediate    X-Ray Shoulder 2 or More Views  Left     Standing Status:   Future     Expected Date:   8/4/2025     Expiration Date:   8/4/2026     May the Radiologist modify the order per protocol to meet the clinical needs of the patient?:   Yes     Release to patient:   Immediate    X-ray Shoulder 2 or More Views Right     Standing Status:   Future     Expected Date:   8/4/2025     Expiration Date:   8/4/2026     May the Radiologist modify the order per protocol to meet the clinical needs of the patient?:   Yes     Release to patient:   Immediate    HIV 1/2 Ag/Ab (4th Gen)     Standing Status:   Future     Expected Date:   8/4/2025     Expiration Date:   10/3/2026     Release to patient:   Immediate    Quantiferon Gold TB     Standing Status:   Future     Expected Date:   8/4/2025     Expiration Date:   10/3/2026    Hepatitis C Antibody     Standing Status:   Future     Expected Date:   8/4/2025     Expiration Date:   10/3/2026     Release to patient:   Immediate    Hepatitis B Surface Antigen     Standing Status:   Future     Expected Date:   8/4/2025     Expiration Date:   10/3/2026    Hepatitis B Core Antibody, Total     Standing Status:   Future     Expected Date:   8/4/2025     Expiration Date:   10/3/2026    Hepatitis B Surface Ab, Qualitative     Standing Status:   Future     Expected Date:   8/4/2025     Expiration Date:   10/3/2026    CBC Auto Differential     Standing Status:   Future     Expected Date:   8/4/2025     Expiration Date:   10/3/2026    Comprehensive Metabolic Panel     Standing Status:   Future     Expected Date:   8/4/2025     Expiration Date:   10/3/2026    C-Reactive Protein     Standing Status:   Future     Expected Date:   8/4/2025     Expiration Date:   10/3/2026    Sedimentation rate     Standing Status:   Future     Expected Date:   8/4/2025     Expiration Date:   10/3/2026    HLA B27 Antigen     Standing Status:   Future     Expected Date:   8/4/2025     Expiration Date:   10/3/2026    CYCLIC CITRULLINATED PEPTIDE (CCP)  ANTIBODY     Please send to [place] for titers.     Standing Status:   Future     Expected Date:   8/4/2025     Expiration Date:   10/4/2026    Rheumatoid Factor IgA     Standing Status:   Future     Expected Date:   8/4/2025     Expiration Date:   11/2/2026    Rheumatoid Factor IgM     Standing Status:   Future     Expected Date:   8/4/2025     Expiration Date:   11/2/2026    Rheumatoid Quantitative     Standing Status:   Future     Expected Date:   8/4/2025     Expiration Date:   11/2/2026    Ambulatory referral/consult to Ophthalmology     Standing Status:   Future     Expected Date:   8/11/2025     Expiration Date:   9/4/2026     Referral Priority:   Routine     Referral Type:   Consultation     Referral Reason:   Specialty Services Required     Requested Specialty:   Ophthalmology     Number of Visits Requested:   1        LUCIE Beatty                 [1]   Social History  Socioeconomic History    Marital status: Single   Tobacco Use    Smoking status: Never    Smokeless tobacco: Never   Substance and Sexual Activity    Alcohol use: Yes     Alcohol/week: 6.0 standard drinks of alcohol     Types: 6 Drinks containing 0.5 oz of alcohol per week     Comment: occas    Drug use: Never

## 2025-08-04 ENCOUNTER — HOSPITAL ENCOUNTER (OUTPATIENT)
Dept: RADIOLOGY | Facility: HOSPITAL | Age: 50
Discharge: HOME | End: 2025-08-04
Attending: NURSE PRACTITIONER

## 2025-08-04 ENCOUNTER — OFFICE VISIT (OUTPATIENT)
Dept: RHEUMATOLOGY | Facility: CLINIC | Age: 50
End: 2025-08-04

## 2025-08-04 VITALS
TEMPERATURE: 97 F | WEIGHT: 185.38 LBS | BODY MASS INDEX: 24.57 KG/M2 | DIASTOLIC BLOOD PRESSURE: 90 MMHG | SYSTOLIC BLOOD PRESSURE: 156 MMHG | OXYGEN SATURATION: 99 % | RESPIRATION RATE: 20 BRPM | HEART RATE: 68 BPM | HEIGHT: 73 IN

## 2025-08-04 DIAGNOSIS — M79.671 BILATERAL FOOT PAIN: ICD-10-CM

## 2025-08-04 DIAGNOSIS — M54.41 CHRONIC MIDLINE LOW BACK PAIN WITH BILATERAL SCIATICA: ICD-10-CM

## 2025-08-04 DIAGNOSIS — Z15.89 HLA B27 (HLA B27 POSITIVE): ICD-10-CM

## 2025-08-04 DIAGNOSIS — M79.642 BILATERAL HAND PAIN: ICD-10-CM

## 2025-08-04 DIAGNOSIS — Z79.899 DRUG-INDUCED IMMUNODEFICIENCY: ICD-10-CM

## 2025-08-04 DIAGNOSIS — M25.511 CHRONIC PAIN OF BOTH SHOULDERS: ICD-10-CM

## 2025-08-04 DIAGNOSIS — G89.29 CHRONIC MIDLINE LOW BACK PAIN WITH BILATERAL SCIATICA: ICD-10-CM

## 2025-08-04 DIAGNOSIS — Z79.899 HIGH RISK MEDICATION USE: ICD-10-CM

## 2025-08-04 DIAGNOSIS — M25.512 CHRONIC PAIN OF BOTH SHOULDERS: ICD-10-CM

## 2025-08-04 DIAGNOSIS — D84.821 DRUG-INDUCED IMMUNODEFICIENCY: ICD-10-CM

## 2025-08-04 DIAGNOSIS — M54.42 CHRONIC MIDLINE LOW BACK PAIN WITH BILATERAL SCIATICA: ICD-10-CM

## 2025-08-04 DIAGNOSIS — Z15.89 HLA B27 (HLA B27 POSITIVE): Primary | ICD-10-CM

## 2025-08-04 DIAGNOSIS — M79.672 BILATERAL FOOT PAIN: ICD-10-CM

## 2025-08-04 DIAGNOSIS — M79.641 BILATERAL HAND PAIN: ICD-10-CM

## 2025-08-04 DIAGNOSIS — G89.29 CHRONIC PAIN OF BOTH SHOULDERS: ICD-10-CM

## 2025-08-04 PROBLEM — I10 ESSENTIAL HYPERTENSION: Status: ACTIVE | Noted: 2025-08-04

## 2025-08-04 PROBLEM — F41.9 ANXIETY DISORDER: Status: ACTIVE | Noted: 2025-08-04

## 2025-08-04 PROBLEM — M19.90 OSTEOARTHRITIS: Status: ACTIVE | Noted: 2025-08-04

## 2025-08-04 PROCEDURE — 71046 X-RAY EXAM CHEST 2 VIEWS: CPT | Mod: TC

## 2025-08-04 PROCEDURE — 73130 X-RAY EXAM OF HAND: CPT | Mod: TC,LT

## 2025-08-04 PROCEDURE — 72114 X-RAY EXAM L-S SPINE BENDING: CPT | Mod: TC

## 2025-08-04 PROCEDURE — 73030 X-RAY EXAM OF SHOULDER: CPT | Mod: TC,LT

## 2025-08-04 PROCEDURE — 73630 X-RAY EXAM OF FOOT: CPT | Mod: TC,LT

## 2025-08-04 PROCEDURE — 73130 X-RAY EXAM OF HAND: CPT | Mod: TC,RT

## 2025-08-04 PROCEDURE — 99215 OFFICE O/P EST HI 40 MIN: CPT | Mod: PBBFAC,25 | Performed by: NURSE PRACTITIONER

## 2025-08-04 PROCEDURE — 72200 X-RAY EXAM SI JOINTS: CPT | Mod: TC

## 2025-08-04 PROCEDURE — 73630 X-RAY EXAM OF FOOT: CPT | Mod: TC,RT

## 2025-08-04 PROCEDURE — 73030 X-RAY EXAM OF SHOULDER: CPT | Mod: TC,RT

## 2025-08-04 RX ORDER — INDOMETHACIN 50 MG/1
CAPSULE ORAL
COMMUNITY

## 2025-08-04 RX ORDER — SILDENAFIL 50 MG/1
TABLET, FILM COATED ORAL
COMMUNITY

## 2025-08-04 RX ORDER — METHOCARBAMOL 750 MG/1
TABLET, FILM COATED ORAL
COMMUNITY

## 2025-08-07 ENCOUNTER — RESULTS FOLLOW-UP (OUTPATIENT)
Dept: RHEUMATOLOGY | Facility: CLINIC | Age: 50
End: 2025-08-07

## 2025-08-07 DIAGNOSIS — D84.821 DRUG-INDUCED IMMUNODEFICIENCY: ICD-10-CM

## 2025-08-07 DIAGNOSIS — Z79.899 HIGH RISK MEDICATION USE: ICD-10-CM

## 2025-08-07 DIAGNOSIS — M47.899 HLA-B27 SPONDYLOARTHROPATHY: Primary | ICD-10-CM

## 2025-08-07 DIAGNOSIS — Z79.899 DRUG-INDUCED IMMUNODEFICIENCY: ICD-10-CM

## 2025-08-07 DIAGNOSIS — M47.899 HLA-B27 SPONDYLOARTHROPATHY: ICD-10-CM

## 2025-08-07 RX ORDER — METHOTREXATE 2.5 MG/1
TABLET ORAL
Qty: 30 TABLET | Refills: 0 | Status: SHIPPED | OUTPATIENT
Start: 2025-08-07

## 2025-08-07 RX ORDER — FOLIC ACID 1 MG/1
1 TABLET ORAL DAILY
Qty: 30 TABLET | Refills: 5 | Status: SHIPPED | OUTPATIENT
Start: 2025-08-07 | End: 2025-08-07 | Stop reason: SDUPTHER

## 2025-08-07 RX ORDER — FOLIC ACID 1 MG/1
1 TABLET ORAL DAILY
Qty: 30 TABLET | Refills: 5 | Status: SHIPPED | OUTPATIENT
Start: 2025-08-07 | End: 2026-02-03

## 2025-08-07 RX ORDER — FOLIC ACID 1 MG/1
1 TABLET ORAL DAILY
Qty: 30 TABLET | Refills: 6 | Status: SHIPPED | OUTPATIENT
Start: 2025-08-07 | End: 2025-08-07 | Stop reason: SDUPTHER

## 2025-08-08 NOTE — TELEPHONE ENCOUNTER
Called to communicate results from Provider. There was no answer to tc. Left a vmm with my name and affiliation and reason for my tc.Requested  a rtc to discuss.